# Patient Record
Sex: MALE | Race: BLACK OR AFRICAN AMERICAN | NOT HISPANIC OR LATINO | Employment: FULL TIME | URBAN - METROPOLITAN AREA
[De-identification: names, ages, dates, MRNs, and addresses within clinical notes are randomized per-mention and may not be internally consistent; named-entity substitution may affect disease eponyms.]

---

## 2022-11-16 ENCOUNTER — HOSPITAL ENCOUNTER (OUTPATIENT)
Dept: OTHER | Facility: HOSPITAL | Age: 47
Discharge: HOME | End: 2022-11-16

## 2024-02-29 ENCOUNTER — HOSPITAL ENCOUNTER (EMERGENCY)
Facility: HOSPITAL | Age: 49
Discharge: HOME | End: 2024-02-29
Attending: EMERGENCY MEDICINE
Payer: COMMERCIAL

## 2024-02-29 VITALS
HEART RATE: 84 BPM | WEIGHT: 158 LBS | BODY MASS INDEX: 22.62 KG/M2 | HEIGHT: 70 IN | OXYGEN SATURATION: 93 % | RESPIRATION RATE: 18 BRPM | SYSTOLIC BLOOD PRESSURE: 136 MMHG | TEMPERATURE: 97.7 F | DIASTOLIC BLOOD PRESSURE: 85 MMHG

## 2024-02-29 DIAGNOSIS — Z99.2 CHRONIC KIDNEY DISEASE WITH END STAGE RENAL FAILURE ON DIALYSIS: ICD-10-CM

## 2024-02-29 DIAGNOSIS — E83.51 HYPOCALCEMIA: Primary | ICD-10-CM

## 2024-02-29 DIAGNOSIS — N18.6 CHRONIC KIDNEY DISEASE WITH END STAGE RENAL FAILURE ON DIALYSIS: ICD-10-CM

## 2024-02-29 LAB
ALBUMIN SERPL-MCNC: 3.2 G/DL (ref 3.5–5.2)
ALBUMIN/GLOB SERPL: 1.2 G/DL
ALP SERPL-CCNC: 136 U/L (ref 39–117)
ALT SERPL W P-5'-P-CCNC: 19 U/L (ref 1–41)
ANION GAP SERPL CALCULATED.3IONS-SCNC: 18.4 MMOL/L (ref 5–15)
AST SERPL-CCNC: 17 U/L (ref 1–40)
BASOPHILS # BLD AUTO: 0.02 10*3/MM3 (ref 0–0.2)
BASOPHILS NFR BLD AUTO: 0.3 % (ref 0–1.5)
BILIRUB SERPL-MCNC: 0.3 MG/DL (ref 0–1.2)
BUN SERPL-MCNC: 84 MG/DL (ref 6–20)
BUN/CREAT SERPL: 6.7 (ref 7–25)
CALCIUM SPEC-SCNC: 5.5 MG/DL (ref 8.6–10.5)
CHLORIDE SERPL-SCNC: 95 MMOL/L (ref 98–107)
CO2 SERPL-SCNC: 22.6 MMOL/L (ref 22–29)
CREAT SERPL-MCNC: 12.62 MG/DL (ref 0.76–1.27)
DEPRECATED RDW RBC AUTO: 52 FL (ref 37–54)
EGFRCR SERPLBLD CKD-EPI 2021: 4.5 ML/MIN/1.73
EOSINOPHIL # BLD AUTO: 0.79 10*3/MM3 (ref 0–0.4)
EOSINOPHIL NFR BLD AUTO: 11.1 % (ref 0.3–6.2)
ERYTHROCYTE [DISTWIDTH] IN BLOOD BY AUTOMATED COUNT: 14.4 % (ref 12.3–15.4)
FLUAV SUBTYP SPEC NAA+PROBE: NOT DETECTED
FLUBV RNA ISLT QL NAA+PROBE: NOT DETECTED
GLOBULIN UR ELPH-MCNC: 2.6 GM/DL
GLUCOSE SERPL-MCNC: 120 MG/DL (ref 65–99)
HCT VFR BLD AUTO: 22.4 % (ref 37.5–51)
HGB BLD-MCNC: 7.5 G/DL (ref 13–17.7)
IMM GRANULOCYTES # BLD AUTO: 0.02 10*3/MM3 (ref 0–0.05)
IMM GRANULOCYTES NFR BLD AUTO: 0.3 % (ref 0–0.5)
LYMPHOCYTES # BLD AUTO: 1.35 10*3/MM3 (ref 0.7–3.1)
LYMPHOCYTES NFR BLD AUTO: 18.9 % (ref 19.6–45.3)
MCH RBC QN AUTO: 33.2 PG (ref 26.6–33)
MCHC RBC AUTO-ENTMCNC: 33.5 G/DL (ref 31.5–35.7)
MCV RBC AUTO: 99.1 FL (ref 79–97)
MONOCYTES # BLD AUTO: 0.72 10*3/MM3 (ref 0.1–0.9)
MONOCYTES NFR BLD AUTO: 10.1 % (ref 5–12)
NEUTROPHILS NFR BLD AUTO: 4.23 10*3/MM3 (ref 1.7–7)
NEUTROPHILS NFR BLD AUTO: 59.3 % (ref 42.7–76)
NRBC BLD AUTO-RTO: 0 /100 WBC (ref 0–0.2)
PLATELET # BLD AUTO: 169 10*3/MM3 (ref 140–450)
PMV BLD AUTO: 9.2 FL (ref 6–12)
POTASSIUM SERPL-SCNC: 4.7 MMOL/L (ref 3.5–5.2)
PROT SERPL-MCNC: 5.8 G/DL (ref 6–8.5)
RBC # BLD AUTO: 2.26 10*6/MM3 (ref 4.14–5.8)
RSV RNA NPH QL NAA+NON-PROBE: NOT DETECTED
SARS-COV-2 RNA RESP QL NAA+PROBE: NOT DETECTED
SODIUM SERPL-SCNC: 136 MMOL/L (ref 136–145)
T4 FREE SERPL-MCNC: 0.9 NG/DL (ref 0.93–1.7)
TSH SERPL DL<=0.05 MIU/L-ACNC: 1.12 UIU/ML (ref 0.27–4.2)
WBC NRBC COR # BLD AUTO: 7.13 10*3/MM3 (ref 3.4–10.8)

## 2024-02-29 PROCEDURE — 80050 GENERAL HEALTH PANEL: CPT | Performed by: EMERGENCY MEDICINE

## 2024-02-29 PROCEDURE — 84439 ASSAY OF FREE THYROXINE: CPT | Performed by: EMERGENCY MEDICINE

## 2024-02-29 PROCEDURE — 99283 EMERGENCY DEPT VISIT LOW MDM: CPT

## 2024-02-29 PROCEDURE — 36415 COLL VENOUS BLD VENIPUNCTURE: CPT

## 2024-02-29 PROCEDURE — 87637 SARSCOV2&INF A&B&RSV AMP PRB: CPT | Performed by: EMERGENCY MEDICINE

## 2024-02-29 PROCEDURE — 25010000002 CALCIUM GLUCONATE-NACL 1-0.675 GM/50ML-% SOLUTION: Performed by: EMERGENCY MEDICINE

## 2024-02-29 PROCEDURE — 96365 THER/PROPH/DIAG IV INF INIT: CPT

## 2024-02-29 RX ORDER — NIFEDIPINE 30 MG
TABLET, EXTENDED RELEASE ORAL
COMMUNITY
Start: 2024-02-23

## 2024-02-29 RX ORDER — LABETALOL 300 MG/1
1 TABLET, FILM COATED ORAL EVERY 12 HOURS SCHEDULED
COMMUNITY
Start: 2023-12-31

## 2024-02-29 RX ORDER — CALCIUM GLUCONATE 20 MG/ML
1000 INJECTION, SOLUTION INTRAVENOUS ONCE
Status: COMPLETED | OUTPATIENT
Start: 2024-02-29 | End: 2024-02-29

## 2024-02-29 RX ORDER — ERGOCALCIFEROL 1.25 MG/1
1 CAPSULE ORAL WEEKLY
COMMUNITY
Start: 2023-12-23

## 2024-02-29 RX ORDER — CALCIUM CARBONATE 750 MG/1
750 TABLET, CHEWABLE ORAL
COMMUNITY

## 2024-02-29 RX ORDER — LOSARTAN POTASSIUM 25 MG/1
1 TABLET ORAL DAILY
COMMUNITY
Start: 2023-12-25

## 2024-02-29 RX ADMIN — CALCIUM GLUCONATE 1000 MG: 20 INJECTION, SOLUTION INTRAVENOUS at 15:50

## 2024-02-29 NOTE — Clinical Note
[facility] EMERGENCY ROOM  [address] [place] [address]  Phone: [phone]  Fax: [phone]    Francisco Griffin was seen and treated in our emergency department on 2/29/2024.  He may return to work on 03/01/2024.         Thank you for choosing [facility].    Stef Ruano DO

## 2024-02-29 NOTE — ED PROVIDER NOTES
"Time: 8:53 AM EST  Date of encounter:  2/29/2024  Independent Historian/Clinical History and Information was obtained by:   Patient    History is limited by: N/A    Chief Complaint: Shaking      History of Present Illness:  Patient is a 48 y.o. year old male who presents to the emergency department for evaluation of shaking episode.  Patient states he was at his dialysis appointment as they were accessing his dialysis graft he started having shaking/chills that he said seemed to start in the arm and his left side and then progressed.  Patient is not currently experiencing any chills.  Staff at dialysis recommended the patient come to the emergency department for further evaluation.  Patient is currently denying any symptoms.    HPI    Patient Care Team  Primary Care Provider: Provider, No Known    Past Medical History:     No Known Allergies  Past Medical History:   Diagnosis Date    Renal disorder      History reviewed. No pertinent surgical history.  History reviewed. No pertinent family history.    Home Medications:  Prior to Admission medications    Not on File        Social History:          Review of Systems:  Review of Systems   Constitutional:  Positive for chills.        Physical Exam:  /84 (Patient Position: Lying)   Pulse 86   Temp 97.7 °F (36.5 °C) (Oral)   Resp 18   Ht 177.8 cm (70\")   Wt 71.7 kg (158 lb)   SpO2 97%   BMI 22.67 kg/m²     Physical Exam  Vitals and nursing note reviewed.   Constitutional:       General: He is not in acute distress.  HENT:      Head: Normocephalic.   Eyes:      Extraocular Movements: Extraocular movements intact.   Cardiovascular:      Rate and Rhythm: Normal rate and regular rhythm.      Heart sounds: Normal heart sounds.   Pulmonary:      Effort: Pulmonary effort is normal. No respiratory distress.      Breath sounds: Normal breath sounds.   Abdominal:      Palpations: Abdomen is soft.      Tenderness: There is no abdominal tenderness.   Musculoskeletal:    "      General: Normal range of motion.      Cervical back: Normal range of motion.      Comments: Dialysis graft  left arm   Skin:     General: Skin is warm and dry.   Neurological:      Mental Status: He is alert and oriented to person, place, and time.   Psychiatric:         Mood and Affect: Mood normal.                  Procedures:  Procedures      Medical Decision Making:      Comorbidities that affect care:    Chronic Kidney Disease    External Notes reviewed:    None      The following orders were placed and all results were independently analyzed by me:  No orders of the defined types were placed in this encounter.      Medications Given in the Emergency Department:  Medications - No data to display     ED Course:         Labs:    Lab Results (last 24 hours)       ** No results found for the last 24 hours. **             Imaging:    No Radiology Exams Resulted Within Past 24 Hours      Differential Diagnosis and Discussion:    Viral syndrome: Differential diagnosis includes but is not limited to influenza, common cold, COVID-19, RSV, adenovirus, enteroviruses, herpes virus, hepatitis virus, measles, mumps, rubella, dengue fever, and possible bacterial infection.    All labs were reviewed and interpreted by me.    MDM       {Critical Care:71607}    {SEPSIS RECOGNITION:78474}    Patient Care Considerations:    {Considerations (Optional):77594}      Consultants/Shared Management Plan:    {Shared Management Plan (Optional):11306}    Social Determinants of Health:    Patient is independent, reliable, and has access to care.       Disposition and Care Coordination:    {Admission consideration:39871}    {Discharge (Optional):05240}    Final diagnoses:   None        ED Disposition       None            This medical record created using voice recognition software.           patient was stable and received IV calcium and will follow-up at his next dialysis.    I have explained the patient´s condition, diagnoses and treatment plan based on the information available to me at this time. I have answered questions and addressed any concerns. The patient has a good  understanding of the patient´s diagnosis, condition, and treatment plan as can be expected at this point. The vital signs have been stable. The patient´s condition is stable and appropriate for discharge from the emergency department.      The patient will pursue further outpatient evaluation with the primary care physician or other designated or consulting physician as outlined in the discharge instructions. They are agreeable to this plan of care and follow-up instructions have been explained in detail. The patient has received these instructions in written format and has expressed an understanding of the discharge instructions. The patient is aware that any significant change in condition or worsening of symptoms should prompt an immediate return to this or the closest emergency department or call to 911.    Final diagnoses:   Hypocalcemia   Chronic kidney disease with end stage renal failure on dialysis        ED Disposition       ED Disposition   Discharge    Condition   Stable    Comment   --               This medical record created using voice recognition software.             Stef Ruano DO  03/08/24 5815

## 2024-03-14 ENCOUNTER — APPOINTMENT (OUTPATIENT)
Dept: GENERAL RADIOLOGY | Facility: HOSPITAL | Age: 49
DRG: 811 | End: 2024-03-14
Payer: COMMERCIAL

## 2024-03-14 ENCOUNTER — HOSPITAL ENCOUNTER (INPATIENT)
Facility: HOSPITAL | Age: 49
LOS: 1 days | Discharge: LEFT BEFORE DISCHARGE | DRG: 811 | End: 2024-03-15
Attending: EMERGENCY MEDICINE | Admitting: INTERNAL MEDICINE
Payer: COMMERCIAL

## 2024-03-14 DIAGNOSIS — J10.1 INFLUENZA A: Primary | ICD-10-CM

## 2024-03-14 DIAGNOSIS — H11.423 CHEMOSIS OF CONJUNCTIVA OF BOTH EYES: ICD-10-CM

## 2024-03-14 DIAGNOSIS — N18.9 CHRONIC KIDNEY DISEASE, UNSPECIFIED CKD STAGE: ICD-10-CM

## 2024-03-14 DIAGNOSIS — D64.9 ANEMIA, UNSPECIFIED TYPE: ICD-10-CM

## 2024-03-14 LAB
ABO GROUP BLD: NORMAL
ABO GROUP BLD: NORMAL
ALBUMIN SERPL-MCNC: 2.9 G/DL (ref 3.5–5.2)
ALBUMIN/GLOB SERPL: 1.1 G/DL
ALP SERPL-CCNC: 79 U/L (ref 39–117)
ALT SERPL W P-5'-P-CCNC: 41 U/L (ref 1–41)
ANION GAP SERPL CALCULATED.3IONS-SCNC: 23 MMOL/L (ref 5–15)
AST SERPL-CCNC: 47 U/L (ref 1–40)
BACTERIA UR QL AUTO: NORMAL /HPF
BASOPHILS # BLD AUTO: 0.01 10*3/MM3 (ref 0–0.2)
BASOPHILS NFR BLD AUTO: 0.2 % (ref 0–1.5)
BILIRUB SERPL-MCNC: 0.3 MG/DL (ref 0–1.2)
BILIRUB UR QL STRIP: NEGATIVE
BLD GP AB SCN SERPL QL: NEGATIVE
BUN SERPL-MCNC: 124 MG/DL (ref 6–20)
BUN/CREAT SERPL: 9.7 (ref 7–25)
CALCIUM SPEC-SCNC: 6.1 MG/DL (ref 8.6–10.5)
CHLORIDE SERPL-SCNC: 92 MMOL/L (ref 98–107)
CLARITY UR: CLEAR
CO2 SERPL-SCNC: 16 MMOL/L (ref 22–29)
COLOR UR: YELLOW
CREAT SERPL-MCNC: 12.76 MG/DL (ref 0.76–1.27)
DEPRECATED RDW RBC AUTO: 48.4 FL (ref 37–54)
EGFRCR SERPLBLD CKD-EPI 2021: 4.4 ML/MIN/1.73
EOSINOPHIL # BLD AUTO: 0.3 10*3/MM3 (ref 0–0.4)
EOSINOPHIL NFR BLD AUTO: 7.1 % (ref 0.3–6.2)
ERYTHROCYTE [DISTWIDTH] IN BLOOD BY AUTOMATED COUNT: 14.4 % (ref 12.3–15.4)
FLUAV SUBTYP SPEC NAA+PROBE: DETECTED
FLUBV RNA ISLT QL NAA+PROBE: NOT DETECTED
GLOBULIN UR ELPH-MCNC: 2.6 GM/DL
GLUCOSE SERPL-MCNC: 111 MG/DL (ref 65–99)
GLUCOSE UR STRIP-MCNC: ABNORMAL MG/DL
HCT VFR BLD AUTO: 18.5 % (ref 37.5–51)
HEMOCCULT STL QL IA: NEGATIVE
HGB BLD-MCNC: 6.5 G/DL (ref 13–17.7)
HGB UR QL STRIP.AUTO: ABNORMAL
HOLD SPECIMEN: NORMAL
HOLD SPECIMEN: NORMAL
HYALINE CASTS UR QL AUTO: NORMAL /LPF
IMM GRANULOCYTES # BLD AUTO: 0.02 10*3/MM3 (ref 0–0.05)
IMM GRANULOCYTES NFR BLD AUTO: 0.5 % (ref 0–0.5)
KETONES UR QL STRIP: NEGATIVE
LEUKOCYTE ESTERASE UR QL STRIP.AUTO: NEGATIVE
LIPASE SERPL-CCNC: 477 U/L (ref 13–60)
LYMPHOCYTES # BLD AUTO: 0.85 10*3/MM3 (ref 0.7–3.1)
LYMPHOCYTES NFR BLD AUTO: 20 % (ref 19.6–45.3)
MCH RBC QN AUTO: 33 PG (ref 26.6–33)
MCHC RBC AUTO-ENTMCNC: 35.1 G/DL (ref 31.5–35.7)
MCV RBC AUTO: 93.9 FL (ref 79–97)
MONOCYTES # BLD AUTO: 0.28 10*3/MM3 (ref 0.1–0.9)
MONOCYTES NFR BLD AUTO: 6.6 % (ref 5–12)
NEUTROPHILS NFR BLD AUTO: 2.78 10*3/MM3 (ref 1.7–7)
NEUTROPHILS NFR BLD AUTO: 65.6 % (ref 42.7–76)
NITRITE UR QL STRIP: NEGATIVE
NRBC BLD AUTO-RTO: 0 /100 WBC (ref 0–0.2)
PH UR STRIP.AUTO: 7.5 [PH] (ref 5–8)
PLATELET # BLD AUTO: 68 10*3/MM3 (ref 140–450)
PMV BLD AUTO: 9.3 FL (ref 6–12)
POTASSIUM SERPL-SCNC: 4.7 MMOL/L (ref 3.5–5.2)
PROT SERPL-MCNC: 5.5 G/DL (ref 6–8.5)
PROT UR QL STRIP: ABNORMAL
RBC # BLD AUTO: 1.97 10*6/MM3 (ref 4.14–5.8)
RBC # UR STRIP: NORMAL /HPF
REF LAB TEST METHOD: NORMAL
RH BLD: POSITIVE
RH BLD: POSITIVE
RSV RNA NPH QL NAA+NON-PROBE: NOT DETECTED
SARS-COV-2 RNA RESP QL NAA+PROBE: NOT DETECTED
SODIUM SERPL-SCNC: 131 MMOL/L (ref 136–145)
SP GR UR STRIP: 1.01 (ref 1–1.03)
SQUAMOUS #/AREA URNS HPF: NORMAL /HPF
T&S EXPIRATION DATE: NORMAL
UROBILINOGEN UR QL STRIP: ABNORMAL
WBC # UR STRIP: NORMAL /HPF
WBC NRBC COR # BLD AUTO: 4.24 10*3/MM3 (ref 3.4–10.8)
WHOLE BLOOD HOLD COAG: NORMAL
WHOLE BLOOD HOLD SPECIMEN: NORMAL

## 2024-03-14 PROCEDURE — 86901 BLOOD TYPING SEROLOGIC RH(D): CPT | Performed by: REGISTERED NURSE

## 2024-03-14 PROCEDURE — 82274 ASSAY TEST FOR BLOOD FECAL: CPT | Performed by: REGISTERED NURSE

## 2024-03-14 PROCEDURE — 99223 1ST HOSP IP/OBS HIGH 75: CPT | Performed by: INTERNAL MEDICINE

## 2024-03-14 PROCEDURE — 81001 URINALYSIS AUTO W/SCOPE: CPT | Performed by: EMERGENCY MEDICINE

## 2024-03-14 PROCEDURE — 86901 BLOOD TYPING SEROLOGIC RH(D): CPT

## 2024-03-14 PROCEDURE — P9016 RBC LEUKOCYTES REDUCED: HCPCS

## 2024-03-14 PROCEDURE — 86900 BLOOD TYPING SEROLOGIC ABO: CPT

## 2024-03-14 PROCEDURE — 87637 SARSCOV2&INF A&B&RSV AMP PRB: CPT | Performed by: REGISTERED NURSE

## 2024-03-14 PROCEDURE — 99285 EMERGENCY DEPT VISIT HI MDM: CPT

## 2024-03-14 PROCEDURE — 80053 COMPREHEN METABOLIC PANEL: CPT | Performed by: EMERGENCY MEDICINE

## 2024-03-14 PROCEDURE — 86923 COMPATIBILITY TEST ELECTRIC: CPT

## 2024-03-14 PROCEDURE — 71046 X-RAY EXAM CHEST 2 VIEWS: CPT

## 2024-03-14 PROCEDURE — 86900 BLOOD TYPING SEROLOGIC ABO: CPT | Performed by: REGISTERED NURSE

## 2024-03-14 PROCEDURE — 85025 COMPLETE CBC W/AUTO DIFF WBC: CPT | Performed by: EMERGENCY MEDICINE

## 2024-03-14 PROCEDURE — 36430 TRANSFUSION BLD/BLD COMPNT: CPT

## 2024-03-14 PROCEDURE — 86850 RBC ANTIBODY SCREEN: CPT | Performed by: REGISTERED NURSE

## 2024-03-14 PROCEDURE — 83690 ASSAY OF LIPASE: CPT | Performed by: EMERGENCY MEDICINE

## 2024-03-14 RX ORDER — SODIUM CHLORIDE 0.9 % (FLUSH) 0.9 %
10 SYRINGE (ML) INJECTION AS NEEDED
Status: DISCONTINUED | OUTPATIENT
Start: 2024-03-14 | End: 2024-03-16 | Stop reason: HOSPADM

## 2024-03-14 NOTE — ED PROVIDER NOTES
Time: 7:46 PM EDT  Date of encounter:  3/14/2024  Independent Historian/Clinical History and Information was obtained by:   Patient    History is limited by: N/A    Chief Complaint: Watery eyes, diarrhea      History of Present Illness:  Patient is a 48 y.o. year old male who presents to the emergency department for evaluation of diarrhea for the past 3 days.  Patient states diarrhea has now resolved.  Patient states that he developed a cough as well as eye swelling yesterday.  Also complains of significant fatigue.  States his eyes have been watery and vision has been blurry due to excessive tearing.  Patient denies nausea/vomiting, fever/chills.  Patient states today is the first day he has not had diarrhea.  Patient denies recent antibiotic use.  He admits to black stools in the past few days.  Patient is on hemodialysis Monday Wednesday Friday, last dialysis was on Monday.  Patient states he missed dialysis yesterday.    HPI    Patient Care Team  Primary Care Provider: Provider, No Known    Past Medical History:     No Known Allergies  Past Medical History:   Diagnosis Date    Renal disorder      History reviewed. No pertinent surgical history.  History reviewed. No pertinent family history.    Home Medications:  Prior to Admission medications    Medication Sig Start Date End Date Taking? Authorizing Provider   calcium carbonate EX (TUMS EX) 750 MG chewable tablet Chew 1 tablet.    Maureen Looney MD   labetalol (NORMODYNE) 300 MG tablet Take 1 tablet by mouth Every 12 (Twelve) Hours. 12/31/23   Maureen Looney MD   losartan (COZAAR) 25 MG tablet Take 1 tablet by mouth Daily. 12/25/23   Maureen Looney MD   NIFEdipine CC (ADALAT CC) 30 MG 24 hr tablet TAKE 2 TABELTS BY MOUTH TWICE DAILY 2/23/24   Maureen Looney MD   vitamin D (ERGOCALCIFEROL) 1.25 MG (10935 UT) capsule capsule Take 1 capsule by mouth 1 (One) Time Per Week. 12/23/23   Maureen Looney MD        Social History:  "  Social History     Tobacco Use    Smoking status: Never     Passive exposure: Never    Smokeless tobacco: Never   Vaping Use    Vaping status: Never Used   Substance Use Topics    Alcohol use: Never    Drug use: Never         Review of Systems:  Review of Systems   Constitutional:  Negative for chills and fever.   HENT:  Negative for congestion, ear pain and sore throat.    Eyes:  Positive for discharge. Negative for pain.   Respiratory:  Positive for cough. Negative for chest tightness and shortness of breath.    Cardiovascular:  Negative for chest pain.   Gastrointestinal:  Positive for blood in stool (Black stool) and diarrhea. Negative for abdominal pain, nausea and vomiting.   Genitourinary:  Negative for dysuria, flank pain and hematuria.   Musculoskeletal:  Negative for joint swelling.   Skin:  Negative for pallor.   Neurological:  Negative for seizures and headaches.   All other systems reviewed and are negative.       Physical Exam:  /96 (BP Location: Right arm, Patient Position: Sitting)   Pulse 88   Temp 98.4 °F (36.9 °C) (Oral)   Resp 18   Ht 177.8 cm (70\")   Wt 68.2 kg (150 lb 5.7 oz)   SpO2 100%   BMI 21.57 kg/m²     Physical Exam  Vitals and nursing note reviewed.   Constitutional:       General: He is not in acute distress.     Appearance: Normal appearance. He is ill-appearing. He is not toxic-appearing.   HENT:      Head: Normocephalic and atraumatic.      Mouth/Throat:      Mouth: Mucous membranes are moist.   Eyes:      General: No scleral icterus.     Extraocular Movements: Extraocular movements intact.      Conjunctiva/sclera:      Right eye: Chemosis present.      Left eye: Chemosis present.   Cardiovascular:      Rate and Rhythm: Normal rate and regular rhythm.      Pulses:           Radial pulses are 2+ on the right side and 2+ on the left side.      Heart sounds: Normal heart sounds.   Pulmonary:      Effort: Pulmonary effort is normal. No respiratory distress.      Breath " sounds: Normal breath sounds.   Abdominal:      General: Abdomen is flat. Bowel sounds are normal.      Palpations: Abdomen is soft.      Tenderness: There is no abdominal tenderness.   Musculoskeletal:         General: Normal range of motion.      Cervical back: Normal range of motion and neck supple.      Right lower le+      Left lower le+   Skin:     General: Skin is warm and dry.   Neurological:      Mental Status: He is alert and oriented to person, place, and time. Mental status is at baseline.                Procedures:  Procedures      Medical Decision Making:      Comorbidities that affect care:    Chronic Kidney Disease    External Notes reviewed:    Previous ED Note: 2024      The following orders were placed and all results were independently analyzed by me:  Orders Placed This Encounter   Procedures    COVID-19, FLU A/B, RSV PCR 1 HR TAT - Swab, Nasopharynx    XR Chest 2 View    [place] Draw    Comprehensive Metabolic Panel    Lipase    Urinalysis With Microscopic If Indicated (No Culture) - Urine, Clean Catch    CBC Auto Differential    Occult Blood, Fecal By Immunoassay - Stool, Per Rectum    Urinalysis, Microscopic Only - Urine, Clean Catch    CBC (No Diff)    Comprehensive Metabolic Panel    Diet: Liquid; Clear Liquid; Fluid Consistency: Thin (IDDSI 0)    Undress & Gown    Ice pack or cool rag to eyes  Misc Nursing Order (Specify)    Verify Informed Consent for Blood Product Administration    Vital Signs Recheck    Vital Signs Recheck    Vital Signs    Intake & Output    Weigh Patient    Oral Care    Place Sequential Compression Device    Maintain Sequential Compression Device    Telemetry - Maintain IV Access    Telemetry - Place Orders & Notify Provider of Results When Patient Experiences Acute Chest Pain, Dysrhythmia or Respiratory Distress    Up With Assistance    Notify Provider (With Default Parameters)    Code Status and Medical Interventions:    Hospitalist (on-call MD unless  specified)    Inpatient Nephrology Consult    Prepare RBC, 2 Units    Type & Screen    ABO RH Specimen Verification    Prepare RBC, 1 Units    Insert Peripheral IV    Insert Peripheral IV    Access VAD    Inpatient Admission    CBC & Differential    Green Top (Gel)    Lavender Top    Gold Top - SST    Light Blue Top       Medications Given in the Emergency Department:  Medications   sodium chloride 0.9 % flush 10 mL (has no administration in time range)   sodium chloride 0.9 % flush 10 mL (10 mL Intravenous Not Given 3/15/24 0041)   sodium chloride 0.9 % flush 10 mL (has no administration in time range)   sodium chloride 0.9 % infusion 40 mL (has no administration in time range)   nitroglycerin (NITROSTAT) SL tablet 0.4 mg (has no administration in time range)   acetaminophen (TYLENOL) tablet 650 mg (has no administration in time range)     Or   acetaminophen (TYLENOL) 160 MG/5ML oral solution 650 mg (has no administration in time range)     Or   acetaminophen (TYLENOL) suppository 650 mg (has no administration in time range)   sennosides-docusate (PERICOLACE) 8.6-50 MG per tablet 2 tablet (has no administration in time range)     And   polyethylene glycol (MIRALAX) packet 17 g (has no administration in time range)     And   bisacodyl (DULCOLAX) EC tablet 5 mg (has no administration in time range)     And   bisacodyl (DULCOLAX) suppository 10 mg (has no administration in time range)   sodium chloride 0.9 % flush 10 mL (has no administration in time range)   pantoprazole (PROTONIX) injection 40 mg (40 mg Intravenous Not Given 3/15/24 0041)   benzonatate (TESSALON) capsule 100 mg (100 mg Oral Given 3/15/24 0037)   guaiFENesin (MUCINEX) 12 hr tablet 600 mg (600 mg Oral Given 3/15/24 0036)        ED Course:         Labs:    Lab Results (last 24 hours)       Procedure Component Value Units Date/Time    POC Influenza A/B [[id number]]  (Abnormal) Collected: 03/14/24 1819    Specimen: Swab Updated: 03/14/24 1820     Rapid  Influenza A Ag Positive     Rapid Influenza B Ag Negative     Internal Control Passed     Lot Number [id number]     Expiration Date 12,082,025    POCT SARS-CoV-2 Antigen NEGAR   (Sirena Perea) [[id number]] Collected: 03/14/24 1824    Specimen: Swab Updated: 03/14/24 1824     SARS Antigen Not Detected     Internal Control Passed     Lot Number [id number]     Expiration Date 7,032,024    CBC & Differential [[id number]]  (Abnormal) Collected: 03/14/24 2025    Specimen: Blood Updated: 03/14/24 2039    Narrative:      The following orders were created for panel order CBC & Differential.  Procedure                               Abnormality         Status                     ---------                               -----------         ------                     CBC Auto Differential[[id number]]        Abnormal            Final result                 Please view results for these tests on the individual orders.    Comprehensive Metabolic Panel [[id number]]  (Abnormal) Collected: 03/14/24 2025    Specimen: Blood Updated: 03/14/24 2105     Glucose 111 mg/dL       mg/dL      Creatinine 12.76 mg/dL      Sodium 131 mmol/L      Potassium 4.7 mmol/L      Chloride 92 mmol/L      CO2 16.0 mmol/L      Calcium 6.1 mg/dL      Total Protein 5.5 g/dL      Albumin 2.9 g/dL      ALT (SGPT) 41 U/L      AST (SGOT) 47 U/L      Alkaline Phosphatase 79 U/L      Total Bilirubin 0.3 mg/dL      Globulin 2.6 gm/dL      A/G Ratio 1.1 g/dL      BUN/Creatinine Ratio 9.7     Anion Gap 23.0 mmol/L      eGFR 4.4 mL/min/1.73      Comment: <15 Indicative of kidney failure       Narrative:      GFR Normal >60  Chronic Kidney Disease <60  Kidney Failure <15      Lipase [[id number]]  (Abnormal) Collected: 03/14/24 2025    Specimen: Blood Updated: 03/14/24 2105     Lipase 477 U/L     CBC Auto Differential [[id number]]  (Abnormal) Collected: 03/14/24 2025    Specimen: Blood Updated: 03/14/24 2039     WBC 4.24 10*3/mm3      RBC 1.97 10*6/mm3      Hemoglobin 6.5 g/dL       Hematocrit 18.5 %      MCV 93.9 fL      MCH 33.0 pg      MCHC 35.1 g/dL      RDW 14.4 %      RDW-SD 48.4 fl      MPV 9.3 fL      Platelets 68 10*3/mm3      Neutrophil % 65.6 %      Lymphocyte % 20.0 %      Monocyte % 6.6 %      Eosinophil % 7.1 %      Basophil % 0.2 %      Immature Grans % 0.5 %      Neutrophils, Absolute 2.78 10*3/mm3      Lymphocytes, Absolute 0.85 10*3/mm3      Monocytes, Absolute 0.28 10*3/mm3      Eosinophils, Absolute 0.30 10*3/mm3      Basophils, Absolute 0.01 10*3/mm3      Immature Grans, Absolute 0.02 10*3/mm3      nRBC 0.0 /100 WBC     Urinalysis With Microscopic If Indicated (No Culture) - Urine, Clean Catch [[id number]]  (Abnormal) Collected: 03/14/24 2049    Specimen: Urine, Clean Catch Updated: 03/14/24 2110     Color, UA Yellow     Appearance, UA Clear     pH, UA 7.5     Specific Gravity, UA 1.012     Glucose,  mg/dL (Trace)     Ketones, UA Negative     Bilirubin, UA Negative     Blood, UA Small (1+)     Protein, UA >=300 mg/dL (3+)     Leuk Esterase, UA Negative     Nitrite, UA Negative     Urobilinogen, UA 0.2 E.U./dL    Urinalysis, Microscopic Only - Urine, Clean Catch [[id number]] Collected: 03/14/24 2049    Specimen: Urine, Clean Catch Updated: 03/14/24 2132     RBC, UA 0-2 /HPF      WBC, UA 0-2 /HPF      Bacteria, UA None Seen /HPF      Squamous Epithelial Cells, UA None Seen /HPF      Hyaline Casts, UA None Seen /LPF      Methodology Manual Light Microscopy    Occult Blood, Fecal By Immunoassay - Stool, Per Rectum [[id number]]  (Normal) Collected: 03/14/24 2058    Specimen: Stool from Per Rectum Updated: 03/14/24 2121     Occult Blood, Fecal by Immunoassay Negative    COVID-19, FLU A/B, RSV PCR 1 HR TAT - Swab, Nasopharynx [[id number]]  (Abnormal) Collected: 03/14/24 2058    Specimen: Swab from Nasopharynx Updated: 03/14/24 2152     COVID19 Not Detected     Influenza A PCR Detected     Influenza B PCR Not Detected     RSV, PCR Not Detected    Narrative:      Fact sheet  for providers: https://www.fda.gov/media/548603/download    Fact sheet for patients: https://www.fda.gov/media/513574/download    Test performed by PCR.             Imaging:    XR Chest 2 View    Result Date: 3/14/2024  PROCEDURE: XR CHEST 2 VW  COMPARISON: None  INDICATIONS: Cough, fatigue  chest congestion weak  FINDINGS:  There is moderate perihilar airspace opacity bilaterally.  Airspace opacity is also noted in the lung bases.  Mild cardiomegaly is noted.  No significant effusion is seen.  A left jugular port infusion catheter is in good position.        1. Findings most suggestive of moderate congestive heart failure.  Superimposed pneumonia, aspiration or pulmonary hemorrhage is not excluded.     Dandy Elizondo M.D.       Electronically Signed and Approved By: Dandy Elizondo M.D. on 3/14/2024 at 21:28                Differential Diagnosis and Discussion:    Cough: Differential diagnosis includes but is not limited to pneumonia, acute bronchitis, upper respiratory infection, ACE inhibitor use, allergic reaction, epiglottitis, seasonal allergies, chemical irritants, exercise-induced asthma, viral syndrome.  Diarrhea: Differential diagnosis includes but is not limited to malabsorption syndrome, bacterial infection, carcinoid syndrome, pancreatic hypersecretion, viral infection, celiac sprue, Crohn's disease, ulcerative colitis, ischemic colitis, colitis, hypermotility, and irritable bowel syndrome.  Eye Pain/Blurred Vision: Differential diagnosis includes but is not limited to dacryocystitis, hordeolum, chalazion, periorbital cellulitis, cavernous sinus thrombosis, blepharitis, and glaucoma.    All labs were reviewed and interpreted by me.    MDM  Number of Diagnoses or Management Options  Anemia, unspecified type  Chemosis of conjunctiva of both eyes  Chronic kidney disease, unspecified CKD stage  Influenza A  Diagnosis management comments: Patient presented with a 3-day history of diarrhea, also severe fatigue  and cough for 2 days.  Missed dialysis yesterday, M/W/F.  The patient's hemoglobin was 6.5.  The patient was symptomatic (fatigue) and will be transfused with 1 unit of PRBC.  Patient appears to be fluid overloaded with chest x-ray showing pulmonary edema, bilateral chemosis as well is worsening pedal edema.  Additionally the patient was positive for influenza A.  Patient will be admitted for hemodialysis as well as further management of anemia.       Amount and/or Complexity of Data Reviewed  Clinical lab tests: reviewed and ordered  Decide to obtain previous medical records or to obtain history from someone other than the patient: yes    Risk of Complications, Morbidity, and/or Mortality  Presenting problems: moderate  Diagnostic procedures: moderate  Management options: moderate    Patient Progress  Patient progress: stable                 Patient Care Considerations:    SEPSIS was considered but is NOT present in the emergency department as SIRS criteria is not present.      Consultants/Shared Management Plan:    Hospitalist: I have discussed the case with Dr. Moraes who agrees to accept the patient for admission.    Social Determinants of Health:    Patient is independent, reliable, and has access to care.       Disposition and Care Coordination:    Admit:   Through independent evaluation of the patient's history, physical, and imperical data, the patient meets criteria for inpatient admission to the hospital.        Final diagnoses:   Influenza A   Anemia, unspecified type   Chemosis of conjunctiva of both eyes   Chronic kidney disease, unspecified CKD stage        ED Disposition       ED Disposition   Decision to Admit    Condition   --    Comment   Level of Care: Telemetry [5]  Diagnosis: Symptomatic anemia [[id number]]  Certification: I Certify That Inpatient Hospital Services Are Medically Necessary For Greater Than 2 Midnights                 This medical record created using voice recognition  software.             Lidya Barnes, BELTRAN  03/15/24 0128

## 2024-03-14 NOTE — Clinical Note
Level of Care: Telemetry [5]   Diagnosis: Symptomatic anemia [[id number]]   Certification: I Certify That Inpatient Hospital Services Are Medically Necessary For Greater Than 2 Midnights

## 2024-03-15 ENCOUNTER — PREP FOR SURGERY (OUTPATIENT)
Dept: OTHER | Facility: HOSPITAL | Age: 49
End: 2024-03-15
Payer: COMMERCIAL

## 2024-03-15 VITALS
WEIGHT: 150.35 LBS | HEART RATE: 102 BPM | OXYGEN SATURATION: 98 % | HEIGHT: 70 IN | SYSTOLIC BLOOD PRESSURE: 183 MMHG | DIASTOLIC BLOOD PRESSURE: 97 MMHG | TEMPERATURE: 99 F | BODY MASS INDEX: 21.53 KG/M2 | RESPIRATION RATE: 17 BRPM

## 2024-03-15 DIAGNOSIS — D64.9 SYMPTOMATIC ANEMIA: Primary | ICD-10-CM

## 2024-03-15 PROBLEM — J10.1 INFLUENZA A: Status: ACTIVE | Noted: 2024-03-15

## 2024-03-15 PROBLEM — I10 PRIMARY HYPERTENSION: Status: ACTIVE | Noted: 2024-03-15

## 2024-03-15 PROBLEM — N18.6 ESRD (END STAGE RENAL DISEASE): Status: ACTIVE | Noted: 2024-03-15

## 2024-03-15 LAB
ALBUMIN SERPL-MCNC: 2.8 G/DL (ref 3.5–5.2)
ALBUMIN/GLOB SERPL: 1.2 G/DL
ALP SERPL-CCNC: 61 U/L (ref 39–117)
ALT SERPL W P-5'-P-CCNC: 33 U/L (ref 1–41)
ANION GAP SERPL CALCULATED.3IONS-SCNC: 23 MMOL/L (ref 5–15)
ANISOCYTOSIS BLD QL: ABNORMAL
AST SERPL-CCNC: 38 U/L (ref 1–40)
BASOPHILS # BLD AUTO: 0.01 10*3/MM3 (ref 0–0.2)
BASOPHILS NFR BLD AUTO: 0.3 % (ref 0–1.5)
BASOPHILS NFR BLD MANUAL: 1 % (ref 0–1.5)
BILIRUB SERPL-MCNC: 0.3 MG/DL (ref 0–1.2)
BUN SERPL-MCNC: 123 MG/DL (ref 6–20)
BUN/CREAT SERPL: 9.5 (ref 7–25)
BURR CELLS BLD QL SMEAR: ABNORMAL
CALCIUM SPEC-SCNC: 5.7 MG/DL (ref 8.6–10.5)
CHLORIDE SERPL-SCNC: 94 MMOL/L (ref 98–107)
CO2 SERPL-SCNC: 15 MMOL/L (ref 22–29)
CREAT SERPL-MCNC: 12.91 MG/DL (ref 0.76–1.27)
DEPRECATED RDW RBC AUTO: 49.9 FL (ref 37–54)
DEPRECATED RDW RBC AUTO: 51.1 FL (ref 37–54)
EGFRCR SERPLBLD CKD-EPI 2021: 4.4 ML/MIN/1.73
ELLIPTOCYTES BLD QL SMEAR: ABNORMAL
EOSINOPHIL # BLD AUTO: 0.21 10*3/MM3 (ref 0–0.4)
EOSINOPHIL NFR BLD AUTO: 6.3 % (ref 0.3–6.2)
ERYTHROCYTE [DISTWIDTH] IN BLOOD BY AUTOMATED COUNT: 14.8 % (ref 12.3–15.4)
ERYTHROCYTE [DISTWIDTH] IN BLOOD BY AUTOMATED COUNT: 15.3 % (ref 12.3–15.4)
GLOBULIN UR ELPH-MCNC: 2.4 GM/DL
GLUCOSE SERPL-MCNC: 98 MG/DL (ref 65–99)
HBV SURFACE AB SER RIA-ACNC: REACTIVE
HBV SURFACE AG SERPL QL IA: NORMAL
HCT VFR BLD AUTO: 20.5 % (ref 37.5–51)
HCT VFR BLD AUTO: 21.9 % (ref 37.5–51)
HGB BLD-MCNC: 7.1 G/DL (ref 13–17.7)
HGB BLD-MCNC: 7.3 G/DL (ref 13–17.7)
LYMPHOCYTES # BLD AUTO: 0.44 10*3/MM3 (ref 0.7–3.1)
LYMPHOCYTES # BLD MANUAL: ABNORMAL 10*3/UL
LYMPHOCYTES NFR BLD AUTO: 13.3 % (ref 19.6–45.3)
MCH RBC QN AUTO: 30.7 PG (ref 26.6–33)
MCH RBC QN AUTO: 31.6 PG (ref 26.6–33)
MCHC RBC AUTO-ENTMCNC: 33.3 G/DL (ref 31.5–35.7)
MCHC RBC AUTO-ENTMCNC: 34.6 G/DL (ref 31.5–35.7)
MCV RBC AUTO: 91.1 FL (ref 79–97)
MCV RBC AUTO: 92 FL (ref 79–97)
MICROCYTES BLD QL: ABNORMAL
MONOCYTES # BLD AUTO: 0.26 10*3/MM3 (ref 0.1–0.9)
MONOCYTES NFR BLD AUTO: 7.9 % (ref 5–12)
NEUTROPHILS # BLD AUTO: ABNORMAL 10*3/UL
NEUTROPHILS NFR BLD AUTO: 2.38 10*3/MM3 (ref 1.7–7)
NEUTROPHILS NFR BLD AUTO: 71.9 % (ref 42.7–76)
NEUTROPHILS NFR BLD MANUAL: 92 % (ref 42.7–76)
PATHOLOGY REVIEW: YES
PLATELET # BLD AUTO: 61 10*3/MM3 (ref 140–450)
PLATELET # BLD AUTO: 71 10*3/MM3 (ref 140–450)
PMV BLD AUTO: 10.2 FL (ref 6–12)
PMV BLD AUTO: 9.7 FL (ref 6–12)
POIKILOCYTOSIS BLD QL SMEAR: ABNORMAL
POTASSIUM SERPL-SCNC: 4.9 MMOL/L (ref 3.5–5.2)
PROT SERPL-MCNC: 5.2 G/DL (ref 6–8.5)
RBC # BLD AUTO: 2.25 10*6/MM3 (ref 4.14–5.8)
RBC # BLD AUTO: 2.38 10*6/MM3 (ref 4.14–5.8)
SMALL PLATELETS BLD QL SMEAR: ABNORMAL
SODIUM SERPL-SCNC: 132 MMOL/L (ref 136–145)
VARIANT LYMPHS NFR BLD MANUAL: 7 % (ref 19.6–45.3)
WBC MORPH BLD: NORMAL
WBC NRBC COR # BLD AUTO: 3.31 10*3/MM3 (ref 3.4–10.8)
WBC NRBC COR # BLD AUTO: 3.76 10*3/MM3 (ref 3.4–10.8)

## 2024-03-15 PROCEDURE — 85025 COMPLETE CBC W/AUTO DIFF WBC: CPT | Performed by: STUDENT IN AN ORGANIZED HEALTH CARE EDUCATION/TRAINING PROGRAM

## 2024-03-15 PROCEDURE — 86706 HEP B SURFACE ANTIBODY: CPT | Performed by: STUDENT IN AN ORGANIZED HEALTH CARE EDUCATION/TRAINING PROGRAM

## 2024-03-15 PROCEDURE — 87340 HEPATITIS B SURFACE AG IA: CPT | Performed by: STUDENT IN AN ORGANIZED HEALTH CARE EDUCATION/TRAINING PROGRAM

## 2024-03-15 PROCEDURE — 5A1D70Z PERFORMANCE OF URINARY FILTRATION, INTERMITTENT, LESS THAN 6 HOURS PER DAY: ICD-10-PCS | Performed by: STUDENT IN AN ORGANIZED HEALTH CARE EDUCATION/TRAINING PROGRAM

## 2024-03-15 PROCEDURE — 25010000002 CALCIUM GLUCONATE 2-0.675 GM/100ML-% SOLUTION: Performed by: STUDENT IN AN ORGANIZED HEALTH CARE EDUCATION/TRAINING PROGRAM

## 2024-03-15 PROCEDURE — 80053 COMPREHEN METABOLIC PANEL: CPT | Performed by: INTERNAL MEDICINE

## 2024-03-15 PROCEDURE — 25010000002 HYDRALAZINE PER 20 MG: Performed by: INTERNAL MEDICINE

## 2024-03-15 PROCEDURE — 85027 COMPLETE CBC AUTOMATED: CPT | Performed by: INTERNAL MEDICINE

## 2024-03-15 PROCEDURE — 99232 SBSQ HOSP IP/OBS MODERATE 35: CPT | Performed by: INTERNAL MEDICINE

## 2024-03-15 PROCEDURE — 99222 1ST HOSP IP/OBS MODERATE 55: CPT | Performed by: INTERNAL MEDICINE

## 2024-03-15 RX ORDER — NIFEDIPINE 30 MG/1
30 TABLET, EXTENDED RELEASE ORAL 2 TIMES DAILY
Status: DISCONTINUED | OUTPATIENT
Start: 2024-03-16 | End: 2024-03-16 | Stop reason: HOSPADM

## 2024-03-15 RX ORDER — NITROGLYCERIN 0.4 MG/1
0.4 TABLET SUBLINGUAL
Status: DISCONTINUED | OUTPATIENT
Start: 2024-03-15 | End: 2024-03-16 | Stop reason: HOSPADM

## 2024-03-15 RX ORDER — PANTOPRAZOLE SODIUM 40 MG/10ML
40 INJECTION, POWDER, LYOPHILIZED, FOR SOLUTION INTRAVENOUS EVERY 12 HOURS SCHEDULED
Status: DISCONTINUED | OUTPATIENT
Start: 2024-03-15 | End: 2024-03-16 | Stop reason: HOSPADM

## 2024-03-15 RX ORDER — GUAIFENESIN 600 MG/1
600 TABLET, EXTENDED RELEASE ORAL EVERY 12 HOURS PRN
Status: DISCONTINUED | OUTPATIENT
Start: 2024-03-15 | End: 2024-03-16 | Stop reason: HOSPADM

## 2024-03-15 RX ORDER — BENZONATATE 100 MG/1
100 CAPSULE ORAL 3 TIMES DAILY PRN
Status: DISCONTINUED | OUTPATIENT
Start: 2024-03-15 | End: 2024-03-16 | Stop reason: HOSPADM

## 2024-03-15 RX ORDER — ACETAMINOPHEN 650 MG/1
650 SUPPOSITORY RECTAL EVERY 4 HOURS PRN
Status: DISCONTINUED | OUTPATIENT
Start: 2024-03-15 | End: 2024-03-16 | Stop reason: HOSPADM

## 2024-03-15 RX ORDER — AMOXICILLIN 250 MG
2 CAPSULE ORAL 2 TIMES DAILY PRN
Status: DISCONTINUED | OUTPATIENT
Start: 2024-03-15 | End: 2024-03-16 | Stop reason: HOSPADM

## 2024-03-15 RX ORDER — BISACODYL 5 MG/1
5 TABLET, DELAYED RELEASE ORAL DAILY PRN
Status: DISCONTINUED | OUTPATIENT
Start: 2024-03-15 | End: 2024-03-16 | Stop reason: HOSPADM

## 2024-03-15 RX ORDER — POLYETHYLENE GLYCOL 3350 17 G/17G
17 POWDER, FOR SOLUTION ORAL DAILY PRN
Status: DISCONTINUED | OUTPATIENT
Start: 2024-03-15 | End: 2024-03-16 | Stop reason: HOSPADM

## 2024-03-15 RX ORDER — CHOLECALCIFEROL (VITAMIN D3) 125 MCG
5 CAPSULE ORAL NIGHTLY PRN
Status: DISCONTINUED | OUTPATIENT
Start: 2024-03-15 | End: 2024-03-15

## 2024-03-15 RX ORDER — CALCIUM GLUCONATE 20 MG/ML
2000 INJECTION, SOLUTION INTRAVENOUS ONCE
Status: COMPLETED | OUTPATIENT
Start: 2024-03-15 | End: 2024-03-15

## 2024-03-15 RX ORDER — TEMAZEPAM 7.5 MG/1
15 CAPSULE ORAL NIGHTLY PRN
Status: DISCONTINUED | OUTPATIENT
Start: 2024-03-15 | End: 2024-03-16 | Stop reason: HOSPADM

## 2024-03-15 RX ORDER — AMLODIPINE BESYLATE 5 MG/1
10 TABLET ORAL
Status: DISCONTINUED | OUTPATIENT
Start: 2024-03-15 | End: 2024-03-15

## 2024-03-15 RX ORDER — BISACODYL 10 MG
10 SUPPOSITORY, RECTAL RECTAL DAILY PRN
Status: DISCONTINUED | OUTPATIENT
Start: 2024-03-15 | End: 2024-03-16 | Stop reason: HOSPADM

## 2024-03-15 RX ORDER — ACETAMINOPHEN 325 MG/1
650 TABLET ORAL EVERY 4 HOURS PRN
Status: DISCONTINUED | OUTPATIENT
Start: 2024-03-15 | End: 2024-03-16 | Stop reason: HOSPADM

## 2024-03-15 RX ORDER — HYDRALAZINE HYDROCHLORIDE 20 MG/ML
20 INJECTION INTRAMUSCULAR; INTRAVENOUS EVERY 6 HOURS PRN
Status: DISCONTINUED | OUTPATIENT
Start: 2024-03-15 | End: 2024-03-16 | Stop reason: HOSPADM

## 2024-03-15 RX ORDER — ACETAMINOPHEN 160 MG/5ML
650 SOLUTION ORAL EVERY 4 HOURS PRN
Status: DISCONTINUED | OUTPATIENT
Start: 2024-03-15 | End: 2024-03-16 | Stop reason: HOSPADM

## 2024-03-15 RX ORDER — HYDRALAZINE HYDROCHLORIDE 20 MG/ML
10 INJECTION INTRAMUSCULAR; INTRAVENOUS ONCE
Status: COMPLETED | OUTPATIENT
Start: 2024-03-15 | End: 2024-03-15

## 2024-03-15 RX ORDER — LISINOPRIL 20 MG/1
40 TABLET ORAL
Status: DISCONTINUED | OUTPATIENT
Start: 2024-03-15 | End: 2024-03-16 | Stop reason: HOSPADM

## 2024-03-15 RX ORDER — SODIUM CHLORIDE 0.9 % (FLUSH) 0.9 %
10 SYRINGE (ML) INJECTION AS NEEDED
Status: DISCONTINUED | OUTPATIENT
Start: 2024-03-15 | End: 2024-03-16 | Stop reason: HOSPADM

## 2024-03-15 RX ORDER — SODIUM CHLORIDE 0.9 % (FLUSH) 0.9 %
10 SYRINGE (ML) INJECTION EVERY 12 HOURS SCHEDULED
Status: DISCONTINUED | OUTPATIENT
Start: 2024-03-15 | End: 2024-03-16 | Stop reason: HOSPADM

## 2024-03-15 RX ORDER — LABETALOL 100 MG/1
300 TABLET, FILM COATED ORAL EVERY 12 HOURS SCHEDULED
Status: DISCONTINUED | OUTPATIENT
Start: 2024-03-15 | End: 2024-03-16 | Stop reason: HOSPADM

## 2024-03-15 RX ORDER — SODIUM CHLORIDE 9 MG/ML
40 INJECTION, SOLUTION INTRAVENOUS AS NEEDED
Status: DISCONTINUED | OUTPATIENT
Start: 2024-03-15 | End: 2024-03-16 | Stop reason: HOSPADM

## 2024-03-15 RX ADMIN — ACETAMINOPHEN 650 MG: 325 TABLET ORAL at 02:25

## 2024-03-15 RX ADMIN — BENZONATATE 100 MG: 100 CAPSULE ORAL at 00:37

## 2024-03-15 RX ADMIN — LISINOPRIL 40 MG: 20 TABLET ORAL at 09:02

## 2024-03-15 RX ADMIN — AMLODIPINE BESYLATE 10 MG: 5 TABLET ORAL at 09:02

## 2024-03-15 RX ADMIN — HYDRALAZINE HYDROCHLORIDE 10 MG: 20 INJECTION, SOLUTION INTRAMUSCULAR; INTRAVENOUS at 17:37

## 2024-03-15 RX ADMIN — Medication 10 ML: at 08:13

## 2024-03-15 RX ADMIN — HYDRALAZINE HYDROCHLORIDE 10 MG: 20 INJECTION, SOLUTION INTRAMUSCULAR; INTRAVENOUS at 20:00

## 2024-03-15 RX ADMIN — CALCIUM GLUCONATE 2000 MG: 20 INJECTION, SOLUTION INTRAVENOUS at 09:02

## 2024-03-15 RX ADMIN — BENZONATATE 100 MG: 100 CAPSULE ORAL at 10:38

## 2024-03-15 RX ADMIN — PANTOPRAZOLE SODIUM 40 MG: 40 INJECTION, POWDER, FOR SOLUTION INTRAVENOUS at 08:13

## 2024-03-15 RX ADMIN — GUAIFENESIN 600 MG: 600 TABLET ORAL at 00:36

## 2024-03-15 RX ADMIN — LABETALOL HYDROCHLORIDE 300 MG: 100 TABLET, FILM COATED ORAL at 08:13

## 2024-03-15 NOTE — CONSULTS
Psychiatric   Consult Note    Patient Name: Francisco Griffin  : 1975  MRN: [id number]  Primary Care Physician:  Provider, No Known  Referring Physician: No ref. provider found  Date of admission: 3/14/2024    Subjective   Subjective     Reason for Consult/ Chief Complaint: ESRD    HPI:  Francisco Griffin is a 48 y.o. male 48-year-old male with past medical history of ESRD on hemodialysis, history of multiple myeloma resulting in ESRD, hypertension who came into the hospital due to worsening fatigue with nausea and vomiting associated with mild blood in the sputum.  He denies any fever chills.  He continues to have a good appetite    Patient's blood pressures have been high and have been running high even during dialysis.  Hemoglobin noted to be 7.3 up from 6.5 after transfusion.  Platelets have been trending down.  Chemistries are consistent with ESRD with creatinine of 13 but BUN has been elevated at 120.  Calcium has been running low at 5.7.  LFTs are unremarkable.  Respiratory swab showed flu a positive.  Chest x-ray with concerns for some volume overload with no obvious infiltrate.  Nephrology's been consulted for ESRD management    Review of Systems  Constitutional:        Weakness tiredness fatigue  Eyes:                       No blurry vision, eye discharge, eye irritation, eye pain  HEENT:                   No acute hair loss, earache and discharge, nasal congestion or discharge, sore throat, postnasal drip  Respiratory:           No shortness of breath coughing sputum production wheezing hemoptysis pleuritic chest pain  Cardiovascular:     No chest pain, orthopnea, PND, dizziness, palpitation, lower extremity edema  Gastrointestinal:   No nausea vomiting diarrhea abdominal pain constipation  Genitourinary:       No urinary incontinence, hesitancy, frequency, urgency, dysuria  Neurological:        No confusion, headache, focal weakness, numbness, dysphasia  Hematologic:         No bruising, bleeding,  pallor, lymphadenopathy  Endocrine:            No coldness, hot flashes, polyuria, abnormal hair growth  Musculoskeletal:  No body pains, aches, arthritic pains, muscle pain ,muscle wasting  Psychiatric:          No low or high mood, anxiety, hallucinations, delusions  Skin.                      No rash, ulcers, bruising, itching    Personal History     Past Medical History:   Diagnosis Date    Renal disorder        History reviewed. No pertinent surgical history.    Family History: family history is not on file. Otherwise pertinent FHx was reviewed and not pertinent to current issue.    Social History:  reports that he has never smoked. He has never been exposed to tobacco smoke. He has never used smokeless tobacco. He reports that he does not drink alcohol and does not use drugs.    Home Medications:  NIFEdipine CC, calcium carbonate EX, labetalol, losartan, and vitamin D    Allergies:  No Known Allergies    Objective    Objective     Vitals:   Temp:  [97.9 °F (36.6 °C)-99.1 °F (37.3 °C)] 98.1 °F (36.7 °C)  Heart Rate:  [83-89] 83  Resp:  [17-20] 17  BP: (138-184)/() 182/104    Physical Exam:             Constitutional:         Awake, alert responsive, conversant, no obvious distress   Eyes:                       PERRLA, sclerae anicteric, no conjunctival injection   HEENT:                   Moist mucous membranes, no nasal or eye discharge, no throat congestion   Neck:                      Supple, no thyromegaly, no lymphadenopathy, trachea midline, no elevated JVD   Respiratory:           Clear to auscultation bilaterally, nonlabored respirations    Cardiovascular:     RRR, no murmurs, rubs, or gallops, palpable pedal pulses bilaterally, No bilateral ankle edema   Gastrointestinal:   Positive bowel sounds, soft, nontender, non-distended, no organomegaly   Musculoskeletal:  No clubbing or cyanosis to extremities, muscle wasting, joint swelling, muscle weakness   Psychiatric:              Appropriate affect,  cooperative   Neurologic:            Awake alert, oriented x 3, strength symmetric in all extremities, Cranial Nerves grossly intact to confrontation, speech clear   Skin:                      No rashes, bruising, skin ulcers, petechiae or ecchymosis    Result Review    Result Review:  I have personally reviewed the results from the time of this admission to 3/15/2024 08:20 EDT and agree with these findings:  []  Laboratory  []  Microbiology  []  Radiology  []  EKG/Telemetry   []  Cardiology/Vascular   []  Pathology  []  Old records  []  Other:    Results from last 7 days   Lab Units 03/15/24  0512 03/14/24 2025   WBC 10*3/mm3 3.76 4.24   HEMOGLOBIN g/dL 7.3* 6.5*   PLATELETS 10*3/mm3 71* 68*     Results from last 7 days   Lab Units 03/15/24  0512 03/14/24 2025   SODIUM mmol/L 132* 131*   POTASSIUM mmol/L 4.9 4.7   CHLORIDE mmol/L 94* 92*   CO2 mmol/L 15.0* 16.0*   BUN mg/dL 123* 124*   CREATININE mg/dL 12.91* 12.76*   GLUCOSE mg/dL 98 111*       Assessment & Plan   Assessment / Plan     Active Hospital Problems:  Active Hospital Problems    Diagnosis     **Symptomatic anemia     ESRD (end stage renal disease)     Primary hypertension     Influenza A      48-year-old male with past medical history of ESRD on hemodialysis, history of multiple myeloma resulting in ESRD, hypertension who came into the hospital due to worsening fatigue with nausea and vomiting associated with mild blood in the sputum with normal imaging noted to be flu positive and a hemoglobin of 6.5 status 1 unit of transfusion    Plan:   Will continue dialyze the patient on Monday Wednesday Friday while inpatient.  Plan for 1 to 2 L UF  Renal diet as tolerated  Will give 2 g of calcium  Patient is being dialyzed on high calcium bath  Patient's blood pressure regiment was recently changed to Lotrel i.e. benazepril 40 mg/amlodipine 10 mg.  Nifedipine was discontinued.  Will give lisinopril with amlodipine here  Iron profile ordered  Patient's  platelets are trending down  Peripheral smear ordered      Electronically signed by Oral Guillen MD, 03/15/24, 8:00 AM EDT.

## 2024-03-15 NOTE — PAYOR COMM NOTE
"Linh Webb (48 y.o. Male)       Date of Birth   1975    Social Security Number       Address   [address] [place] [address]    Home Phone       MRN   [id number]       [place]   [facility]    Marital Status   Single                            Admission Date   3/14/24    Admission Type   Emergency    Admitting Provider   Elmer Moraes Jr., MD    Attending Provider   Rebeca Cloud MD    Department, Room/Bed   [facility] 4TH FLOOR MEDICAL TELEMETRY UNIT, 410/1       Discharge Date       Discharge Disposition       Discharge Destination                                 Attending Provider: Rebeca Cloud MD    Allergies: No Known Allergies    Isolation: Droplet   Infection: Influenza (24)   Code Status: CPR    Ht: 177.8 cm (70\")   Wt: 68.2 kg (150 lb 5.7 oz)    Admission Cmt: None   Principal Problem: Symptomatic anemia [D64.9]                   Active Insurance as of 3/14/2024       Primary Coverage       Payor Plan Insurance Group Employer/Plan Group    [employer] BLUE [employer]OSS [employer] BLUE CROSS BLUE [employer]IELD PPO [id number]       Payor Plan Address Payor Plan Phone Number Payor Plan Fax Number Effective Dates    [address] [phone]  2021 - None Entered    [place] [address]         Subscriber Name Subscriber Birth Date Member ID       LINH WEBB 1975 [id number]                     Emergency Contacts            No emergency contacts on file.                 History & Physical        Elmer Moraes Jr., MD at [address] [facility]           [facility] HISTORY AND PHYSICAL  Date: 3/14/2024   Patient Name: Linh Webb  : 1975  MRN: [id number]  Primary Care Physician:  Provider, No Known  Date of admission: 3/14/2024    Subjective  Subjective     Chief Complaint: Fatigue    HPI:    Linh Webb is a 48 y.o. male past medical history of end-stage renal disease, multiple myeloma that presented to the emergency department for evaluation " of fatigue.  On review of systems though he did have multiple other complaints.  States over the past few days he had nausea and vomiting along with cough and blood mixed in with his sputum.  States that this had not occurred today however.  Also endorse melena over the past 2 days but no melena today.  Denied any fevers, chills, sweats, chest pain, shortness of breath, palpitations, abdominal pain, rash.  In the emergency department found to be anemic with hemoglobin 6.7 which is down from 7.5 most recently.  Also noted to be influenza positive and an elevated lipase of 477.  Patient receiving 1 unit packed red blood cell in the emergency department and will be admitted for ongoing monitoring management.      Personal History     Past Medical History:  Past Medical History:   Diagnosis Date    Renal disorder    Multiple myeloma      Past Surgical History:  History reviewed. No pertinent surgical history.  Fistula placement    Family History:   History reviewed. No pertinent family history.      Social History:   Social History     Tobacco Use    Smoking status: Never     Passive exposure: Never    Smokeless tobacco: Never   Vaping Use    Vaping status: Never Used   Substance Use Topics    Alcohol use: Never         Home Medications:  NIFEdipine CC, calcium carbonate EX, labetalol, losartan, and vitamin D    Allergies:  No Known Allergies    Review of Systems   All systems were reviewed and negative except for: Fatigue, melena, hemoptysis, malaise    Objective  Objective     Vitals:   Temp:  [97.9 °F (36.6 °C)-99.1 °F (37.3 °C)] 97.9 °F (36.6 °C)  Heart Rate:  [85-89] 85  Resp:  [17-20] 18  BP: (138-150)/(74-94) 150/94    Physical Exam    Constitutional: Awake, alert, no acute distress   Eyes: Pupils equal, sclerae anicteric, no conjunctival injection   HENT: NCAT, mucous membranes moist   Neck: Supple, no thyromegaly, no lymphadenopathy, trachea midline   Respiratory: Clear to auscultation bilaterally, nonlabored  respirations    Cardiovascular: RRR, no murmurs, rubs, or gallops, palpable pedal pulses bilaterally   Gastrointestinal: Positive bowel sounds, soft, nontender, nondistended   Musculoskeletal: No bilateral ankle edema, no clubbing or cyanosis to extremities   Psychiatric: Appropriate affect, cooperative   Neurologic: Oriented x 3, strength symmetric in all extremities, Cranial Nerves grossly intact to confrontation, speech clear   Skin: No rashes     Result Review   Result Review:  I have personally reviewed the results from the time of this admission to 3/14/2024 23:11 EDT and agree with these findings:  [x]  Laboratory  []  Microbiology  [x]  Radiology  []  EKG/Telemetry   []  Cardiology/Vascular   []  Pathology  []  Old records  []  Other:      Assessment & Plan  Assessment / Plan     Assessment/Plan:   Symptomatic anemia: Receiving 1 unit packed red blood cell in the emergency department.  Will trend H&H.  Fecal occult was negative but will empirically keep him on clear liquid diet for now pending serial H&H's.  Start Protonix as well.  He did complain of melena over the past few days but stated no melena seen today.  Monitor on telemetry.  End-stage renal disease with volume overload: Consult nephrology and appreciate recommendations.  Supplemental oxygen if needed.  Currently tolerating room air.  Monitor intake and output.  Serial labs  Hemoptysis?:  Endorsed small amounts of blood mixed in with sputum with his cough.  Will continue to monitor this for now.  Chest x-ray is clear and patient states he had no further episodes today.  Elevated lipase: Abdomen soft and nontender.  Supportive care.  Low threshold to order CT abdomen pelvis if any new signs or symptoms develop.  Influenza: Supportive care.  Supplemental oxygen if needed.  Currently tolerating room air.  Thrombocytopenia: Transfuse for platelet count less than 10,000 and less active signs of bleeding and will transfuse for platelet count less than  50,000.  Serial labs.  History of multiple myeloma: Resume any home medications once verified.  Serial labs.      DVT prophylaxis:  Mechanical DVT prophylaxis orders are signed and held.          CODE STATUS:    Code Status (Patient has no pulse and is not breathing): CPR (Attempt to Resuscitate)  Medical Interventions (Patient has pulse or is breathing): Full Support      Admission Status:  I believe this patient meets inpatient status.    Electronically signed by Elmer Moraes Jr, MD, 03/14/24, 11:11 PM EDT.             Electronically signed by Elmer Moraes Jr., MD at 03/14/24 2317          Emergency Department Notes        Lidya Barnes APRN at 03/14/24 1946       Summary:22                 Time: 7:46 PM EDT  Date of encounter:  3/14/2024  Independent Historian/Clinical History and Information was obtained by:   Patient    History is limited by: N/A    Chief Complaint: Watery eyes, diarrhea      History of Present Illness:  Patient is a 48 y.o. year old male who presents to the emergency department for evaluation of diarrhea for the past 3 days.  Patient states diarrhea has now resolved.  Patient states that he developed a cough as well as eye swelling yesterday.  Also complains of significant fatigue.  States his eyes have been watery and vision has been blurry due to excessive tearing.  Patient denies nausea/vomiting, fever/chills.  Patient states today is the first day he has not had diarrhea.  Patient denies recent antibiotic use.  He admits to black stools in the past few days.  Patient is on hemodialysis Monday Wednesday Friday, last dialysis was on Monday.  Patient states he missed dialysis yesterday.    HPI    Patient Care Team  Primary Care Provider: Provider, No Known    Past Medical History:     No Known Allergies  Past Medical History:   Diagnosis Date    Renal disorder      History reviewed. No pertinent surgical history.  History reviewed. No pertinent family history.    Home  "Medications:  Prior to Admission medications    Medication Sig Start Date End Date Taking? Authorizing Provider   calcium carbonate EX (TUMS EX) 750 MG chewable tablet Chew 1 tablet.    Maureen Looney MD   labetalol (NORMODYNE) 300 MG tablet Take 1 tablet by mouth Every 12 (Twelve) Hours. 12/31/23   Maureen Looney MD   losartan (COZAAR) 25 MG tablet Take 1 tablet by mouth Daily. 12/25/23   Maureen Looney MD   NIFEdipine CC (ADALAT CC) 30 MG 24 hr tablet TAKE 2 TABELTS BY MOUTH TWICE DAILY 2/23/24   Maureen Looney MD   vitamin D (ERGOCALCIFEROL) 1.25 MG (21162 UT) capsule capsule Take 1 capsule by mouth 1 (One) Time Per Week. 12/23/23   Maureen Looney MD        Social History:   Social History     Tobacco Use    Smoking status: Never     Passive exposure: Never    Smokeless tobacco: Never   Vaping Use    Vaping status: Never Used   Substance Use Topics    Alcohol use: Never    Drug use: Never         Review of Systems:  Review of Systems   Constitutional:  Negative for chills and fever.   HENT:  Negative for congestion, ear pain and sore throat.    Eyes:  Positive for discharge. Negative for pain.   Respiratory:  Positive for cough. Negative for chest tightness and shortness of breath.    Cardiovascular:  Negative for chest pain.   Gastrointestinal:  Positive for blood in stool (Black stool) and diarrhea. Negative for abdominal pain, nausea and vomiting.   Genitourinary:  Negative for dysuria, flank pain and hematuria.   Musculoskeletal:  Negative for joint swelling.   Skin:  Negative for pallor.   Neurological:  Negative for seizures and headaches.   All other systems reviewed and are negative.       Physical Exam:  /96 (BP Location: Right arm, Patient Position: Sitting)   Pulse 88   Temp 98.4 °F (36.9 °C) (Oral)   Resp 18   Ht 177.8 cm (70\")   Wt 68.2 kg (150 lb 5.7 oz)   SpO2 100%   BMI 21.57 kg/m²     Physical Exam  Vitals and nursing note reviewed. "   Constitutional:       General: He is not in acute distress.     Appearance: Normal appearance. He is ill-appearing. He is not toxic-appearing.   HENT:      Head: Normocephalic and atraumatic.      Mouth/Throat:      Mouth: Mucous membranes are moist.   Eyes:      General: No scleral icterus.     Extraocular Movements: Extraocular movements intact.      Conjunctiva/sclera:      Right eye: Chemosis present.      Left eye: Chemosis present.   Cardiovascular:      Rate and Rhythm: Normal rate and regular rhythm.      Pulses:           Radial pulses are 2+ on the right side and 2+ on the left side.      Heart sounds: Normal heart sounds.   Pulmonary:      Effort: Pulmonary effort is normal. No respiratory distress.      Breath sounds: Normal breath sounds.   Abdominal:      General: Abdomen is flat. Bowel sounds are normal.      Palpations: Abdomen is soft.      Tenderness: There is no abdominal tenderness.   Musculoskeletal:         General: Normal range of motion.      Cervical back: Normal range of motion and neck supple.      Right lower le+      Left lower le+   Skin:     General: Skin is warm and dry.   Neurological:      Mental Status: He is alert and oriented to person, place, and time. Mental status is at baseline.                Procedures:  Procedures      Medical Decision Making:      Comorbidities that affect care:    Chronic Kidney Disease    External Notes reviewed:    Previous ED Note: 2024      The following orders were placed and all results were independently analyzed by me:  Orders Placed This Encounter   Procedures    COVID-19, FLU A/B, RSV PCR 1 HR TAT - Swab, Nasopharynx    XR Chest 2 View    [place] Draw    Comprehensive Metabolic Panel    Lipase    Urinalysis With Microscopic If Indicated (No Culture) - Urine, Clean Catch    CBC Auto Differential    Occult Blood, Fecal By Immunoassay - Stool, Per Rectum    Urinalysis, Microscopic Only - Urine, Clean Catch    CBC (No Diff)     Comprehensive Metabolic Panel    Diet: Liquid; Clear Liquid; Fluid Consistency: Thin (IDDSI 0)    Undress & Gown    Ice pack or cool rag to eyes  Misc Nursing Order (Specify)    Verify Informed Consent for Blood Product Administration    Vital Signs Recheck    Vital Signs Recheck    Vital Signs    Intake & Output    Weigh Patient    Oral Care    Place Sequential Compression Device    Maintain Sequential Compression Device    Telemetry - Maintain IV Access    Telemetry - Place Orders & Notify Provider of Results When Patient Experiences Acute Chest Pain, Dysrhythmia or Respiratory Distress    Up With Assistance    Notify Provider (With Default Parameters)    Code Status and Medical Interventions:    Hospitalist (on-call MD unless specified)    Inpatient Nephrology Consult    Prepare RBC, 2 Units    Type & Screen    ABO RH Specimen Verification    Prepare RBC, 1 Units    Insert Peripheral IV    Insert Peripheral IV    Access VAD    Inpatient Admission    CBC & Differential    Green Top (Gel)    Lavender Top    Gold Top - SST    Light Blue Top       Medications Given in the Emergency Department:  Medications   sodium chloride 0.9 % flush 10 mL (has no administration in time range)   sodium chloride 0.9 % flush 10 mL (10 mL Intravenous Not Given 3/15/24 0041)   sodium chloride 0.9 % flush 10 mL (has no administration in time range)   sodium chloride 0.9 % infusion 40 mL (has no administration in time range)   nitroglycerin (NITROSTAT) SL tablet 0.4 mg (has no administration in time range)   acetaminophen (TYLENOL) tablet 650 mg (has no administration in time range)     Or   acetaminophen (TYLENOL) 160 MG/5ML oral solution 650 mg (has no administration in time range)     Or   acetaminophen (TYLENOL) suppository 650 mg (has no administration in time range)   sennosides-docusate (PERICOLACE) 8.6-50 MG per tablet 2 tablet (has no administration in time range)     And   polyethylene glycol (MIRALAX) packet 17 g (has no  administration in time range)     And   bisacodyl (DULCOLAX) EC tablet 5 mg (has no administration in time range)     And   bisacodyl (DULCOLAX) suppository 10 mg (has no administration in time range)   sodium chloride 0.9 % flush 10 mL (has no administration in time range)   pantoprazole (PROTONIX) injection 40 mg (40 mg Intravenous Not Given 3/15/24 0041)   benzonatate (TESSALON) capsule 100 mg (100 mg Oral Given 3/15/24 0037)   guaiFENesin (MUCINEX) 12 hr tablet 600 mg (600 mg Oral Given 3/15/24 0036)        ED Course:         Labs:    Lab Results (last 24 hours)       Procedure Component Value Units Date/Time    POC Influenza A/B [[id number]]  (Abnormal) Collected: 03/14/24 1819    Specimen: Swab Updated: 03/14/24 1820     Rapid Influenza A Ag Positive     Rapid Influenza B Ag Negative     Internal Control Passed     Lot Number [id number]     Expiration Date 12,082,025    POCT SARS-CoV-2 Antigen NEGAR   (Pack UCs) [[id number]] Collected: 03/14/24 1824    Specimen: Swab Updated: 03/14/24 1824     SARS Antigen Not Detected     Internal Control Passed     Lot Number [id number]     Expiration Date 7,032,024    CBC & Differential [[id number]]  (Abnormal) Collected: 03/14/24 2025    Specimen: Blood Updated: 03/14/24 2039    Narrative:      The following orders were created for panel order CBC & Differential.  Procedure                               Abnormality         Status                     ---------                               -----------         ------                     CBC Auto Differential[[id number]]        Abnormal            Final result                 Please view results for these tests on the individual orders.    Comprehensive Metabolic Panel [[id number]]  (Abnormal) Collected: 03/14/24 2025    Specimen: Blood Updated: 03/14/24 2105     Glucose 111 mg/dL       mg/dL      Creatinine 12.76 mg/dL      Sodium 131 mmol/L      Potassium 4.7 mmol/L      Chloride 92 mmol/L      CO2 16.0 mmol/L      Calcium  6.1 mg/dL      Total Protein 5.5 g/dL      Albumin 2.9 g/dL      ALT (SGPT) 41 U/L      AST (SGOT) 47 U/L      Alkaline Phosphatase 79 U/L      Total Bilirubin 0.3 mg/dL      Globulin 2.6 gm/dL      A/G Ratio 1.1 g/dL      BUN/Creatinine Ratio 9.7     Anion Gap 23.0 mmol/L      eGFR 4.4 mL/min/1.73      Comment: <15 Indicative of kidney failure       Narrative:      GFR Normal >60  Chronic Kidney Disease <60  Kidney Failure <15      Lipase [[id number]]  (Abnormal) Collected: 03/14/24 2025    Specimen: Blood Updated: 03/14/24 2105     Lipase 477 U/L     CBC Auto Differential [[id number]]  (Abnormal) Collected: 03/14/24 2025    Specimen: Blood Updated: 03/14/24 2039     WBC 4.24 10*3/mm3      RBC 1.97 10*6/mm3      Hemoglobin 6.5 g/dL      Hematocrit 18.5 %      MCV 93.9 fL      MCH 33.0 pg      MCHC 35.1 g/dL      RDW 14.4 %      RDW-SD 48.4 fl      MPV 9.3 fL      Platelets 68 10*3/mm3      Neutrophil % 65.6 %      Lymphocyte % 20.0 %      Monocyte % 6.6 %      Eosinophil % 7.1 %      Basophil % 0.2 %      Immature Grans % 0.5 %      Neutrophils, Absolute 2.78 10*3/mm3      Lymphocytes, Absolute 0.85 10*3/mm3      Monocytes, Absolute 0.28 10*3/mm3      Eosinophils, Absolute 0.30 10*3/mm3      Basophils, Absolute 0.01 10*3/mm3      Immature Grans, Absolute 0.02 10*3/mm3      nRBC 0.0 /100 WBC     Urinalysis With Microscopic If Indicated (No Culture) - Urine, Clean Catch [[id number]]  (Abnormal) Collected: 03/14/24 2049    Specimen: Urine, Clean Catch Updated: 03/14/24 2110     Color, UA Yellow     Appearance, UA Clear     pH, UA 7.5     Specific Gravity, UA 1.012     Glucose,  mg/dL (Trace)     Ketones, UA Negative     Bilirubin, UA Negative     Blood, UA Small (1+)     Protein, UA >=300 mg/dL (3+)     Leuk Esterase, UA Negative     Nitrite, UA Negative     Urobilinogen, UA 0.2 E.U./dL    Urinalysis, Microscopic Only - Urine, Clean Catch [[id number]] Collected: 03/14/24 2049    Specimen: Urine, Clean Catch Updated:  03/14/24 2132     RBC, UA 0-2 /HPF      WBC, UA 0-2 /HPF      Bacteria, UA None Seen /HPF      Squamous Epithelial Cells, UA None Seen /HPF      Hyaline Casts, UA None Seen /LPF      Methodology Manual Light Microscopy    Occult Blood, Fecal By Immunoassay - Stool, Per Rectum [[id number]]  (Normal) Collected: 03/14/24 2058    Specimen: Stool from Per Rectum Updated: 03/14/24 2121     Occult Blood, Fecal by Immunoassay Negative    COVID-19, FLU A/B, RSV PCR 1 HR TAT - Swab, Nasopharynx [[id number]]  (Abnormal) Collected: 03/14/24 2058    Specimen: Swab from Nasopharynx Updated: 03/14/24 2152     COVID19 Not Detected     Influenza A PCR Detected     Influenza B PCR Not Detected     RSV, PCR Not Detected    Narrative:      Fact sheet for providers: https://www.fda.gov/media/246421/download    Fact sheet for patients: https://www.fda.gov/media/633806/download    Test performed by PCR.             Imaging:    XR Chest 2 View    Result Date: 3/14/2024  PROCEDURE: XR CHEST 2 VW  COMPARISON: None  INDICATIONS: Cough, fatigue  chest congestion weak  FINDINGS:  There is moderate perihilar airspace opacity bilaterally.  Airspace opacity is also noted in the lung bases.  Mild cardiomegaly is noted.  No significant effusion is seen.  A left jugular port infusion catheter is in good position.        1. Findings most suggestive of moderate congestive heart failure.  Superimposed pneumonia, aspiration or pulmonary hemorrhage is not excluded.     Dandy Elizondo M.D.       Electronically Signed and Approved By: Dandy Elizondo M.D. on 3/14/2024 at 21:28                Differential Diagnosis and Discussion:    Cough: Differential diagnosis includes but is not limited to pneumonia, acute bronchitis, upper respiratory infection, ACE inhibitor use, allergic reaction, epiglottitis, seasonal allergies, chemical irritants, exercise-induced asthma, viral syndrome.  Diarrhea: Differential diagnosis includes but is not limited to malabsorption  syndrome, bacterial infection, carcinoid syndrome, pancreatic hypersecretion, viral infection, celiac sprue, Crohn's disease, ulcerative colitis, ischemic colitis, colitis, hypermotility, and irritable bowel syndrome.  Eye Pain/Blurred Vision: Differential diagnosis includes but is not limited to dacryocystitis, hordeolum, chalazion, periorbital cellulitis, cavernous sinus thrombosis, blepharitis, and glaucoma.    All labs were reviewed and interpreted by me.    MDM  Number of Diagnoses or Management Options  Anemia, unspecified type  Chemosis of conjunctiva of both eyes  Chronic kidney disease, unspecified CKD stage  Influenza A  Diagnosis management comments: Patient presented with a 3-day history of diarrhea, also severe fatigue and cough for 2 days.  Missed dialysis yesterday, M/W/F.  The patient's hemoglobin was 6.5.  The patient was symptomatic (fatigue) and will be transfused with 1 unit of PRBC.  Patient appears to be fluid overloaded with chest x-ray showing pulmonary edema, bilateral chemosis as well is worsening pedal edema.  Additionally the patient was positive for influenza A.  Patient will be admitted for hemodialysis as well as further management of anemia.       Amount and/or Complexity of Data Reviewed  Clinical lab tests: reviewed and ordered  Decide to obtain previous medical records or to obtain history from someone other than the patient: yes    Risk of Complications, Morbidity, and/or Mortality  Presenting problems: moderate  Diagnostic procedures: moderate  Management options: moderate    Patient Progress  Patient progress: stable                 Patient Care Considerations:    SEPSIS was considered but is NOT present in the emergency department as SIRS criteria is not present.      Consultants/Shared Management Plan:    Hospitalist: I have discussed the case with Dr. Moraes who agrees to accept the patient for admission.    Social Determinants of Health:    Patient is independent, reliable,  and has access to care.       Disposition and Care Coordination:    Admit:   Through independent evaluation of the patient's history, physical, and imperical data, the patient meets criteria for inpatient admission to the hospital.        Final diagnoses:   Influenza A   Anemia, unspecified type   Chemosis of conjunctiva of both eyes   Chronic kidney disease, unspecified CKD stage        ED Disposition       ED Disposition   Decision to Admit    Condition   --    Comment   Level of Care: Telemetry [5]  Diagnosis: Symptomatic anemia [[id number]]  Certification: I Certify That Inpatient Hospital Services Are Medically Necessary For Greater Than 2 Midnights                 This medical record created using voice recognition software.             Lidya Barnes APRN  03/15/24 0128      Electronically signed by Lidya Barnes APRN at 03/15/24 0128          Physician Progress Notes (last 24 hours)        Rebeca Cloud MD at 03/15/24 0843           [facility] Emergencyist Progress Note  Date: 3/15/2024  Patient Name: Francisco Griffin  : 1975  MRN: [id number]  Date of admission: 3/14/2024      Subjective   Subjective     Chief Complaint: Fatigue    Summary: Francisco Griffin is a 48 y.o. male past medical history of end-stage renal disease, multiple myeloma that presented to the emergency department for evaluation of fatigue.  On review of systems though he did have multiple other complaints.  States over the past few days he had nausea and vomiting along with cough and blood mixed in with his sputum.  States that this had not occurred today however.  Also endorse melena over the past 2 days but no melena today.  Denied any fevers, chills, sweats, chest pain, shortness of breath, palpitations, abdominal pain, rash.  In the emergency department found to be anemic with hemoglobin 6.7 which is down from 7.5 most recently.  Also noted to be influenza positive and an elevated lipase of 477.  Patient receiving 1 unit  packed red blood cell in the emergency department and will be admitted for ongoing monitoring management.     Interval Followup: Patient seen and evaluated this morning.  He denies any further melena.  He denies any further nausea/ vomiting or cough, including hemoptysis.  His main complaint is that he is just tired and did not sleep well last night.    Review of Systems   All systems were reviewed and negative except for: As noted in interval follow-up.  Objective   Objective     Vitals:   Temp:  [97.9 °F (36.6 °C)-99.1 °F (37.3 °C)] 98.1 °F (36.7 °C)  Heart Rate:  [83-89] 83  Resp:  [17-20] 17  BP: (138-184)/() 182/104  Physical Exam    Constitutional: Sitting up on the side of the bed, drowsy appearing but appropriate in his conversation, no acute distress              Eyes: Pupils equal, sclerae anicteric, no conjunctival injection              HENT: NCAT, mucous membranes moist              Neck: Supple              Respiratory: Clear to auscultation bilaterally, nonlabored respirations               Cardiovascular: RRR, no appreciable murmurs, palpable pedal pulses bilaterally              Gastrointestinal: Positive bowel sounds, soft, nontender, nondistended              Musculoskeletal: No bilateral ankle edema, no clubbing or cyanosis to extremities              Psychiatric: Appropriate affect, cooperative              Neurologic: Oriented x 3, strength symmetric in all extremities, Cranial Nerves grossly intact to confrontation, speech clear    Result Review    Result Review:  I have personally reviewed the results from the time of this admission to 3/15/2024 08:43 EDT and agree with these findings:  [x]  Laboratory  [x]  Microbiology  [x]  Radiology  []  EKG/Telemetry   []  Cardiology/Vascular   []  Pathology  []  Old records  []  Other:    Assessment & Plan   Assessment / Plan     Assessment/Plan:  Symptomatic anemia: Status post 1 unit packed red blood cell in the emergency department.  Will trend  H&H.  Globin 7.3 posttransfusion.  GI consulted.  Originally plans for EGD on this morning however able to be coordinated given patient's need for dialysis on today as well.  Continue IV Protonix .  Monitor on telemetry.  End-stage renal disease with volume overload: Nephrology consult noted.  Plans for hemodialysis on today.  Supplemental oxygen if needed.  Currently continue oxygenation on room air.  Monitor intake and output.  Serial labs  Hemoptysis?:  Endorsed small amounts of blood mixed in with sputum with his cough.  Will continue to monitor this for now.  Chest x-ray is clear and patient states he had no further episodes.  Elevated lipase: Abdomen soft and nontender.  Supportive care.  Low threshold to order CT abdomen pelvis if any new signs or symptoms develop.  Influenza A: Supportive care.  Supplemental oxygen if needed.  Currently tolerating room air.  Thrombocytopenia: Transfuse for platelet count less than 10,000 and less active signs of bleeding and will transfuse for platelet count less than 50,000.  Serial labs.  History of multiple myeloma: Resume any home medications once verified.  Serial labs.     Discussed plan with RN.    DVT prophylaxis:  Mechanical DVT prophylaxis orders are present.        CODE STATUS:   Code Status (Patient has no pulse and is not breathing): CPR (Attempt to Resuscitate)  Medical Interventions (Patient has pulse or is breathing): Full Support        Electronically signed by Rebeca Cloud MD, 03/15/24, 8:43 AM EDT.             Electronically signed by Rebeca Cloud MD at 03/15/24 1240          Consult Notes (last 24 hours)        Parth Wilcox MD at 03/15/24 1103        Consult Orders    1. Inpatient Gastroenterology Consult [[id number]] ordered by Rebeca Cloud MD at 03/15/24 0836                 [facility]  Initial Inpatient Consult Note    Referring Provider: Aleisha    Reason for Consultation:  Anemia    Subjective     History of present illness:    48 y.o. male with a past medical history of ESRD on hemodialysis-Monday Wednesday and Friday, hypertension, and history of multiple myeloma resulting in ESRD who presented to the hospital 3/14/2024 due to worsening fatigue.  His hemoglobin when he arrived in the emergency room was 6.5, today it is 7.3.  He says that his symptoms began on Tuesday, which included nausea and vomiting along with cough-scant amount of blood mixed with sputum.  He also states he has had liquid bowel movements that are black in color for the last 4-5 days.  Patient says he has had no nausea or vomiting or black stool since yesterday.  Patient denies abdominal pain, fevers, hematochezia, NSAID/thinner use, and new medications.    03/15/2024  Hgb-7.3  HCT-21.9  Platelets-71  Fecal occult-negative  Lipase-477  Sodium-132  Calcium-5.7  BUN-123  Creatinine-12.91  eGFR-4.4    Unable to locate and previous EGD/Colonoscopy reports    Past Medical History:  Past Medical History:   Diagnosis Date    Renal disorder      Past Surgical History:  History reviewed. No pertinent surgical history.   Social History:   Social History     Tobacco Use    Smoking status: Never     Passive exposure: Never    Smokeless tobacco: Never   Substance Use Topics    Alcohol use: Never      Family History:  History reviewed. No pertinent family history.    Home Meds:  Medications Prior to Admission   Medication Sig Dispense Refill Last Dose    calcium carbonate EX (TUMS EX) 750 MG chewable tablet Chew 1 tablet.       labetalol (NORMODYNE) 300 MG tablet Take 1 tablet by mouth Every 12 (Twelve) Hours.       losartan (COZAAR) 25 MG tablet Take 1 tablet by mouth Daily.       NIFEdipine CC (ADALAT CC) 30 MG 24 hr tablet TAKE 2 TABELTS BY MOUTH TWICE DAILY       vitamin D (ERGOCALCIFEROL) 1.25 MG (82539 UT) capsule capsule Take 1 capsule by mouth 1 (One) Time Per Week.        Current Meds:   amLODIPine, 10 mg, Oral,  Q24H  labetalol, 300 mg, Oral, Q12H  lisinopril, 40 mg, Oral, Q24H  pantoprazole, 40 mg, Intravenous, Q12H  sodium chloride, 10 mL, Intravenous, Q12H      Allergies:  No Known Allergies  Review of Systems  Pertinent items are noted in HPI     Objective     Vital Signs  Temp:  [97.9 °F (36.6 °C)-99.1 °F (37.3 °C)] 98.1 °F (36.7 °C)  Heart Rate:  [83-89] 83  Resp:  [17-20] 17  BP: (138-184)/() 182/104  Physical Exam:  General Appearance:    Alert, cooperative, in no acute distress   Head:    Normocephalic, without obvious abnormality, atraumatic   Eyes:          conjunctivae and sclerae normal, no icterus   Throat:   no thrush, oral mucosa moist   Neck:   Supple, no adenopathy   Lungs:     Unlabored breathing    Heart:    Regular rhythm and normal rate    Chest Wall:    No abnormalities observed   Abdomen:     Soft, non distended, non tender   Extremities:   no edema, no redness   Skin:   No bruising or rash   Psychiatric:  normal mood and insight     Results Review:   I reviewed the patient's new clinical results.    Results from last 7 days   Lab Units 03/15/24  0512 03/14/24 2025   WBC 10*3/mm3 3.76 4.24   HEMOGLOBIN g/dL 7.3* 6.5*   HEMATOCRIT % 21.9* 18.5*   PLATELETS 10*3/mm3 71* 68*     Results from last 7 days   Lab Units 03/15/24  0512 03/14/24 2025   SODIUM mmol/L 132* 131*   POTASSIUM mmol/L 4.9 4.7   CHLORIDE mmol/L 94* 92*   CO2 mmol/L 15.0* 16.0*   BUN mg/dL 123* 124*   CREATININE mg/dL 12.91* 12.76*   CALCIUM mg/dL 5.7* 6.1*   BILIRUBIN mg/dL 0.3 0.3   ALK PHOS U/L 61 79   ALT (SGPT) U/L 33 41   AST (SGOT) U/L 38 47*   GLUCOSE mg/dL 98 111*         Lab Results   Lab Value Date/Time    LIPASE 477 (H) 03/14/2024 2025       Radiology:  XR Chest 2 View    Result Date: 3/14/2024    1. Findings most suggestive of moderate congestive heart failure.  Superimposed pneumonia, aspiration or pulmonary hemorrhage is not excluded.     Dandy Elizondo M.D.       Electronically Signed and Approved By: Dandy  LENO Elizondo on 3/14/2024 at 21:28               Assessment & Plan     Symptomatic anemia    ESRD (end stage renal disease)    Primary hypertension    Influenza A       Assessment:  Anemia  End-stage renal disease  Hematemesis  Thrombocytopenia    Plan:  Patient arrived in emergency room hemoglobin was at 6.5-received 1 unit PRBC's in emergency department bringing his hemoglobin up to 7.3  Discussed with patient about proceeding with EGD this afternoon to evaluate anemia-patient is agreeable-he did have Jell-O and an Italian ice cup around 845 so procedure will be performed 6 hours from then per anesthesia  Continue to monitor H&H  Fecal occult was negative  Continue to monitor platelets and if less than 50,000 transfuse  Patient understands and agrees to the plan      I discussed the patients findings and my recommendations with patient and nursing staff.    Electronically signed by BELTRAN Diego, 03/15/24, 11:03 AM EDT.    Attending Attestation  I reviewed the below documentation and evaluation and discussed the care plan with BELTRAN Diego, I agree with her findings and plan as documented.    Patient had eaten this morning but also needing hemodialysis this afternoon and therefore EGD was canceled.  He is hemodynamically stable without evidence of ongoing GI bleeding.  We will therefore continue PPI and monitor his hemoglobin.  Plan for EGD on Monday    Electronically signed by Parth Wilcox MD, 03/15/24, 4:40 PM EDT.    Portions of this documentation were transcribed electronically from a voice recognition software.  I confirm all data accurately represents the service(s) I performed at today's visit.           Electronically signed by Parth Wilcox MD at 03/15/24 7184       Oral Guillen MD at 03/15/24 1278        Consult Orders    1. Inpatient Nephrology Consult [[id number]] ordered by Elmer Moraes Jr., MD at 03/14/24 4871                   [facility]   Consult  Note    Patient Name: Francisco Griffni  : 1975  MRN: [id number]  Primary Care Physician:  Provider, No Known  Referring Physician: No ref. provider found  Date of admission: 3/14/2024    Subjective   Subjective     Reason for Consult/ Chief Complaint: ESRD    HPI:  Francisco Griffin is a 48 y.o. male 48-year-old male with past medical history of ESRD on hemodialysis, history of multiple myeloma resulting in ESRD, hypertension who came into the hospital due to worsening fatigue with nausea and vomiting associated with mild blood in the sputum.  He denies any fever chills.  He continues to have a good appetite    Patient's blood pressures have been high and have been running high even during dialysis.  Hemoglobin noted to be 7.3 up from 6.5 after transfusion.  Platelets have been trending down.  Chemistries are consistent with ESRD with creatinine of 13 but BUN has been elevated at 120.  Calcium has been running low at 5.7.  LFTs are unremarkable.  Respiratory swab showed flu a positive.  Chest x-ray with concerns for some volume overload with no obvious infiltrate.  Nephrology's been consulted for ESRD management    Review of Systems  Constitutional:        Weakness tiredness fatigue  Eyes:                       No blurry vision, eye discharge, eye irritation, eye pain  HEENT:                   No acute hair loss, earache and discharge, nasal congestion or discharge, sore throat, postnasal drip  Respiratory:           No shortness of breath coughing sputum production wheezing hemoptysis pleuritic chest pain  Cardiovascular:     No chest pain, orthopnea, PND, dizziness, palpitation, lower extremity edema  Gastrointestinal:   No nausea vomiting diarrhea abdominal pain constipation  Genitourinary:       No urinary incontinence, hesitancy, frequency, urgency, dysuria  Neurological:        No confusion, headache, focal weakness, numbness, dysphasia  Hematologic:         No bruising, bleeding, pallor,  lymphadenopathy  Endocrine:            No coldness, hot flashes, polyuria, abnormal hair growth  Musculoskeletal:  No body pains, aches, arthritic pains, muscle pain ,muscle wasting  Psychiatric:          No low or high mood, anxiety, hallucinations, delusions  Skin.                      No rash, ulcers, bruising, itching    Personal History     Past Medical History:   Diagnosis Date    Renal disorder        History reviewed. No pertinent surgical history.    Family History: family history is not on file. Otherwise pertinent FHx was reviewed and not pertinent to current issue.    Social History:  reports that he has never smoked. He has never been exposed to tobacco smoke. He has never used smokeless tobacco. He reports that he does not drink alcohol and does not use drugs.    Home Medications:  NIFEdipine CC, calcium carbonate EX, labetalol, losartan, and vitamin D    Allergies:  No Known Allergies    Objective    Objective     Vitals:   Temp:  [97.9 °F (36.6 °C)-99.1 °F (37.3 °C)] 98.1 °F (36.7 °C)  Heart Rate:  [83-89] 83  Resp:  [17-20] 17  BP: (138-184)/() 182/104    Physical Exam:             Constitutional:         Awake, alert responsive, conversant, no obvious distress   Eyes:                       PERRLA, sclerae anicteric, no conjunctival injection   HEENT:                   Moist mucous membranes, no nasal or eye discharge, no throat congestion   Neck:                      Supple, no thyromegaly, no lymphadenopathy, trachea midline, no elevated JVD   Respiratory:           Clear to auscultation bilaterally, nonlabored respirations    Cardiovascular:     RRR, no murmurs, rubs, or gallops, palpable pedal pulses bilaterally, No bilateral ankle edema   Gastrointestinal:   Positive bowel sounds, soft, nontender, non-distended, no organomegaly   Musculoskeletal:  No clubbing or cyanosis to extremities, muscle wasting, joint swelling, muscle weakness   Psychiatric:              Appropriate affect,  cooperative   Neurologic:            Awake alert, oriented x 3, strength symmetric in all extremities, Cranial Nerves grossly intact to confrontation, speech clear   Skin:                      No rashes, bruising, skin ulcers, petechiae or ecchymosis    Result Review    Result Review:  I have personally reviewed the results from the time of this admission to 3/15/2024 08:20 EDT and agree with these findings:  []  Laboratory  []  Microbiology  []  Radiology  []  EKG/Telemetry   []  Cardiology/Vascular   []  Pathology  []  Old records  []  Other:    Results from last 7 days   Lab Units 03/15/24  0512 03/14/24 2025   WBC 10*3/mm3 3.76 4.24   HEMOGLOBIN g/dL 7.3* 6.5*   PLATELETS 10*3/mm3 71* 68*     Results from last 7 days   Lab Units 03/15/24  0512 03/14/24 2025   SODIUM mmol/L 132* 131*   POTASSIUM mmol/L 4.9 4.7   CHLORIDE mmol/L 94* 92*   CO2 mmol/L 15.0* 16.0*   BUN mg/dL 123* 124*   CREATININE mg/dL 12.91* 12.76*   GLUCOSE mg/dL 98 111*       Assessment & Plan   Assessment / Plan     Active Hospital Problems:  Active Hospital Problems    Diagnosis     **Symptomatic anemia     ESRD (end stage renal disease)     Primary hypertension     Influenza A      48-year-old male with past medical history of ESRD on hemodialysis, history of multiple myeloma resulting in ESRD, hypertension who came into the hospital due to worsening fatigue with nausea and vomiting associated with mild blood in the sputum with normal imaging noted to be flu positive and a hemoglobin of 6.5 status 1 unit of transfusion    Plan:   Will continue dialyze the patient on Monday Wednesday Friday while inpatient.  Plan for 1 to 2 L UF  Renal diet as tolerated  Will give 2 g of calcium  Patient is being dialyzed on high calcium bath  Patient's blood pressure regiment was recently changed to Lotrel i.e. benazepril 40 mg/amlodipine 10 mg.  Nifedipine was discontinued.  Will give lisinopril with amlodipine here  Iron profile ordered  Patient's  platelets are trending down  Peripheral smear ordered      Electronically signed by Oral Guillen MD, 03/15/24, 8:00 AM EDT.         Electronically signed by Oral Guillen MD at 03/15/24 0820

## 2024-03-15 NOTE — PROGRESS NOTES
[facility]   Hospitalist Progress Note  Date: 3/15/2024  Patient Name: Francisco Griffin  : 1975  MRN: [id number]  Date of admission: 3/14/2024      Subjective   Subjective     Chief Complaint: Fatigue    Summary: Francisco Griffin is a 48 y.o. male past medical history of end-stage renal disease, multiple myeloma that presented to the emergency department for evaluation of fatigue.  On review of systems though he did have multiple other complaints.  States over the past few days he had nausea and vomiting along with cough and blood mixed in with his sputum.  States that this had not occurred today however.  Also endorse melena over the past 2 days but no melena today.  Denied any fevers, chills, sweats, chest pain, shortness of breath, palpitations, abdominal pain, rash.  In the emergency department found to be anemic with hemoglobin 6.7 which is down from 7.5 most recently.  Also noted to be influenza positive and an elevated lipase of 477.  Patient receiving 1 unit packed red blood cell in the emergency department and will be admitted for ongoing monitoring management.     Interval Followup: Patient seen and evaluated this morning.  He denies any further melena.  He denies any further nausea/ vomiting or cough, including hemoptysis.  His main complaint is that he is just tired and did not sleep well last night.    Review of Systems   All systems were reviewed and negative except for: As noted in interval follow-up.  Objective   Objective     Vitals:   Temp:  [97.9 °F (36.6 °C)-99.1 °F (37.3 °C)] 98.1 °F (36.7 °C)  Heart Rate:  [83-89] 83  Resp:  [17-20] 17  BP: (138-184)/() 182/104  Physical Exam    Constitutional: Sitting up on the side of the bed, drowsy appearing but appropriate in his conversation, no acute distress              Eyes: Pupils equal, sclerae anicteric, no conjunctival injection              HENT: NCAT, mucous membranes moist              Neck: Supple              Respiratory: Clear to  auscultation bilaterally, nonlabored respirations               Cardiovascular: RRR, no appreciable murmurs, palpable pedal pulses bilaterally              Gastrointestinal: Positive bowel sounds, soft, nontender, nondistended              Musculoskeletal: No bilateral ankle edema, no clubbing or cyanosis to extremities              Psychiatric: Appropriate affect, cooperative              Neurologic: Oriented x 3, strength symmetric in all extremities, Cranial Nerves grossly intact to confrontation, speech clear    Result Review    Result Review:  I have personally reviewed the results from the time of this admission to 3/15/2024 08:43 EDT and agree with these findings:  [x]  Laboratory  [x]  Microbiology  [x]  Radiology  []  EKG/Telemetry   []  Cardiology/Vascular   []  Pathology  []  Old records  []  Other:    Assessment & Plan   Assessment / Plan     Assessment/Plan:  Symptomatic anemia: Status post 1 unit packed red blood cell in the emergency department.  Will trend H&H.  Globin 7.3 posttransfusion.  GI consulted.  Originally plans for EGD on this morning however able to be coordinated given patient's need for dialysis on today as well.  Continue IV Protonix .  Monitor on telemetry.  End-stage renal disease with volume overload: Nephrology consult noted.  Plans for hemodialysis on today.  Supplemental oxygen if needed.  Currently continue oxygenation on room air.  Monitor intake and output.  Serial labs  Hemoptysis?:  Endorsed small amounts of blood mixed in with sputum with his cough.  Will continue to monitor this for now.  Chest x-ray is clear and patient states he had no further episodes.  Elevated lipase: Abdomen soft and nontender.  Supportive care.  Low threshold to order CT abdomen pelvis if any new signs or symptoms develop.  Influenza A: Supportive care.  Supplemental oxygen if needed.  Currently tolerating room air.  Thrombocytopenia: Transfuse for platelet count less than 10,000 and less active  signs of bleeding and will transfuse for platelet count less than 50,000.  Serial labs.  History of multiple myeloma: Resume any home medications once verified.  Serial labs.     Discussed plan with RN.    DVT prophylaxis:  Mechanical DVT prophylaxis orders are present.        CODE STATUS:   Code Status (Patient has no pulse and is not breathing): CPR (Attempt to Resuscitate)  Medical Interventions (Patient has pulse or is breathing): Full Support        Electronically signed by Rebeca Cloud MD, 03/15/24, 8:43 AM EDT.

## 2024-03-15 NOTE — H&P
[facility] HISTORY AND PHYSICAL  Date: 3/14/2024   Patient Name: Francisco Griffin  : 1975  MRN: [id number]  Primary Care Physician:  Provider, No Known  Date of admission: 3/14/2024    Subjective   Subjective     Chief Complaint: Fatigue    HPI:    Francisco Griffin is a 48 y.o. male past medical history of end-stage renal disease, multiple myeloma that presented to the emergency department for evaluation of fatigue.  On review of systems though he did have multiple other complaints.  States over the past few days he had nausea and vomiting along with cough and blood mixed in with his sputum.  States that this had not occurred today however.  Also endorse melena over the past 2 days but no melena today.  Denied any fevers, chills, sweats, chest pain, shortness of breath, palpitations, abdominal pain, rash.  In the emergency department found to be anemic with hemoglobin 6.7 which is down from 7.5 most recently.  Also noted to be influenza positive and an elevated lipase of 477.  Patient receiving 1 unit packed red blood cell in the emergency department and will be admitted for ongoing monitoring management.      Personal History     Past Medical History:  Past Medical History:   Diagnosis Date    Renal disorder    Multiple myeloma      Past Surgical History:  History reviewed. No pertinent surgical history.  Fistula placement    Family History:   History reviewed. No pertinent family history.      Social History:   Social History     Tobacco Use    Smoking status: Never     Passive exposure: Never    Smokeless tobacco: Never   Vaping Use    Vaping status: Never Used   Substance Use Topics    Alcohol use: Never         Home Medications:  NIFEdipine CC, calcium carbonate EX, labetalol, losartan, and vitamin D    Allergies:  No Known Allergies    Review of Systems   All systems were reviewed and negative except for: Fatigue, melena, hemoptysis, malaise    Objective   Objective     Vitals:   Temp:  [97.9  °F (36.6 °C)-99.1 °F (37.3 °C)] 97.9 °F (36.6 °C)  Heart Rate:  [85-89] 85  Resp:  [17-20] 18  BP: (138-150)/(74-94) 150/94    Physical Exam    Constitutional: Awake, alert, no acute distress   Eyes: Pupils equal, sclerae anicteric, no conjunctival injection   HENT: NCAT, mucous membranes moist   Neck: Supple, no thyromegaly, no lymphadenopathy, trachea midline   Respiratory: Clear to auscultation bilaterally, nonlabored respirations    Cardiovascular: RRR, no murmurs, rubs, or gallops, palpable pedal pulses bilaterally   Gastrointestinal: Positive bowel sounds, soft, nontender, nondistended   Musculoskeletal: No bilateral ankle edema, no clubbing or cyanosis to extremities   Psychiatric: Appropriate affect, cooperative   Neurologic: Oriented x 3, strength symmetric in all extremities, Cranial Nerves grossly intact to confrontation, speech clear   Skin: No rashes     Result Review    Result Review:  I have personally reviewed the results from the time of this admission to 3/14/2024 23:11 EDT and agree with these findings:  [x]  Laboratory  []  Microbiology  [x]  Radiology  []  EKG/Telemetry   []  Cardiology/Vascular   []  Pathology  []  Old records  []  Other:      Assessment & Plan   Assessment / Plan     Assessment/Plan:   Symptomatic anemia: Receiving 1 unit packed red blood cell in the emergency department.  Will trend H&H.  Fecal occult was negative but will empirically keep him on clear liquid diet for now pending serial H&H's.  Start Protonix as well.  He did complain of melena over the past few days but stated no melena seen today.  Monitor on telemetry.  End-stage renal disease with volume overload: Consult nephrology and appreciate recommendations.  Supplemental oxygen if needed.  Currently tolerating room air.  Monitor intake and output.  Serial labs  Hemoptysis?:  Endorsed small amounts of blood mixed in with sputum with his cough.  Will continue to monitor this for now.  Chest x-ray is clear and patient  states he had no further episodes today.  Elevated lipase: Abdomen soft and nontender.  Supportive care.  Low threshold to order CT abdomen pelvis if any new signs or symptoms develop.  Influenza: Supportive care.  Supplemental oxygen if needed.  Currently tolerating room air.  Thrombocytopenia: Transfuse for platelet count less than 10,000 and less active signs of bleeding and will transfuse for platelet count less than 50,000.  Serial labs.  History of multiple myeloma: Resume any home medications once verified.  Serial labs.      DVT prophylaxis:  Mechanical DVT prophylaxis orders are signed and held.          CODE STATUS:    Code Status (Patient has no pulse and is not breathing): CPR (Attempt to Resuscitate)  Medical Interventions (Patient has pulse or is breathing): Full Support      Admission Status:  I believe this patient meets inpatient status.    Electronically signed by Elmer Moraes Jr, MD, 03/14/24, 11:11 PM EDT.

## 2024-03-15 NOTE — NURSING NOTE
1815 - Call placed to primary RN regarding blood pressure, BP remains high and headache is still there.   1848 - Call placed to primary RN, Roslaba regarding blood pressure, she is awaiting MD return call.   1925 - JEVON Leahy called to request manual blood pressure reading  1934 - Manual BP reading of 196/106 called to JEVON Leahy.

## 2024-03-15 NOTE — PLAN OF CARE
Goal Outcome Evaluation:  Plan of Care Reviewed With: patient           Outcome Evaluation: Patient is alert and oriented, on room air. Patient is completing dialysis today. EGD scheduled for Monday. Continuing plan of care.

## 2024-03-15 NOTE — PLAN OF CARE
Goal Outcome Evaluation:  Pt complained of a headache, which was managed with oral tylenol. Pt's BP runs high, please see vital sign entries. Pt is able to ambulate ad clement. Pt is awaiting treatment planning. Clay Melendez RN

## 2024-03-15 NOTE — CONSULTS
[facility]  Initial Inpatient Consult Note    Referring Provider: Aleisha    Reason for Consultation: Anemia    Subjective     History of present illness:    48 y.o. male with a past medical history of ESRD on hemodialysis-Monday Wednesday and Friday, hypertension, and history of multiple myeloma resulting in ESRD who presented to the hospital 3/14/2024 due to worsening fatigue.  His hemoglobin when he arrived in the emergency room was 6.5, today it is 7.3.  He says that his symptoms began on Tuesday, which included nausea and vomiting along with cough-scant amount of blood mixed with sputum.  He also states he has had liquid bowel movements that are black in color for the last 4-5 days.  Patient says he has had no nausea or vomiting or black stool since yesterday.  Patient denies abdominal pain, fevers, hematochezia, NSAID/thinner use, and new medications.    03/15/2024  Hgb-7.3  HCT-21.9  Platelets-71  Fecal occult-negative  Lipase-477  Sodium-132  Calcium-5.7  BUN-123  Creatinine-12.91  eGFR-4.4    Unable to locate and previous EGD/Colonoscopy reports    Past Medical History:  Past Medical History:   Diagnosis Date    Renal disorder      Past Surgical History:  History reviewed. No pertinent surgical history.   Social History:   Social History     Tobacco Use    Smoking status: Never     Passive exposure: Never    Smokeless tobacco: Never   Substance Use Topics    Alcohol use: Never      Family History:  History reviewed. No pertinent family history.    Home Meds:  Medications Prior to Admission   Medication Sig Dispense Refill Last Dose    calcium carbonate EX (TUMS EX) 750 MG chewable tablet Chew 1 tablet.       labetalol (NORMODYNE) 300 MG tablet Take 1 tablet by mouth Every 12 (Twelve) Hours.       losartan (COZAAR) 25 MG tablet Take 1 tablet by mouth Daily.       NIFEdipine CC (ADALAT CC) 30 MG 24 hr tablet TAKE 2 TABELTS BY MOUTH TWICE DAILY       vitamin D (ERGOCALCIFEROL) 1.25 MG  (28972 UT) capsule capsule Take 1 capsule by mouth 1 (One) Time Per Week.        Current Meds:   amLODIPine, 10 mg, Oral, Q24H  labetalol, 300 mg, Oral, Q12H  lisinopril, 40 mg, Oral, Q24H  pantoprazole, 40 mg, Intravenous, Q12H  sodium chloride, 10 mL, Intravenous, Q12H      Allergies:  No Known Allergies  Review of Systems  Pertinent items are noted in HPI     Objective     Vital Signs  Temp:  [97.9 °F (36.6 °C)-99.1 °F (37.3 °C)] 98.1 °F (36.7 °C)  Heart Rate:  [83-89] 83  Resp:  [17-20] 17  BP: (138-184)/() 182/104  Physical Exam:  General Appearance:    Alert, cooperative, in no acute distress   Head:    Normocephalic, without obvious abnormality, atraumatic   Eyes:          conjunctivae and sclerae normal, no icterus   Throat:   no thrush, oral mucosa moist   Neck:   Supple, no adenopathy   Lungs:     Unlabored breathing    Heart:    Regular rhythm and normal rate    Chest Wall:    No abnormalities observed   Abdomen:     Soft, non distended, non tender   Extremities:   no edema, no redness   Skin:   No bruising or rash   Psychiatric:  normal mood and insight     Results Review:   I reviewed the patient's new clinical results.    Results from last 7 days   Lab Units 03/15/24  0512 03/14/24 2025   WBC 10*3/mm3 3.76 4.24   HEMOGLOBIN g/dL 7.3* 6.5*   HEMATOCRIT % 21.9* 18.5*   PLATELETS 10*3/mm3 71* 68*     Results from last 7 days   Lab Units 03/15/24  0512 03/14/24 2025   SODIUM mmol/L 132* 131*   POTASSIUM mmol/L 4.9 4.7   CHLORIDE mmol/L 94* 92*   CO2 mmol/L 15.0* 16.0*   BUN mg/dL 123* 124*   CREATININE mg/dL 12.91* 12.76*   CALCIUM mg/dL 5.7* 6.1*   BILIRUBIN mg/dL 0.3 0.3   ALK PHOS U/L 61 79   ALT (SGPT) U/L 33 41   AST (SGOT) U/L 38 47*   GLUCOSE mg/dL 98 111*         Lab Results   Lab Value Date/Time    LIPASE 477 (H) 03/14/2024 2025       Radiology:  XR Chest 2 View    Result Date: 3/14/2024    1. Findings most suggestive of moderate congestive heart failure.  Superimposed pneumonia,  aspiration or pulmonary hemorrhage is not excluded.     Dandy Elizondo M.D.       Electronically Signed and Approved By: Dandy Elizondo M.D. on 3/14/2024 at 21:28               Assessment & Plan     Symptomatic anemia    ESRD (end stage renal disease)    Primary hypertension    Influenza A       Assessment:  Anemia  End-stage renal disease  Hematemesis  Thrombocytopenia    Plan:  Patient arrived in emergency room hemoglobin was at 6.5-received 1 unit PRBC's in emergency department bringing his hemoglobin up to 7.3  Discussed with patient about proceeding with EGD this afternoon to evaluate anemia-patient is agreeable-he did have Jell-O and an Italian ice cup around 845 so procedure will be performed 6 hours from then per anesthesia  Continue to monitor H&H  Fecal occult was negative  Continue to monitor platelets and if less than 50,000 transfuse  Patient understands and agrees to the plan      I discussed the patients findings and my recommendations with patient and nursing staff.    Electronically signed by BELTRAN Diego, 03/15/24, 11:03 AM EDT.    Attending Attestation  I reviewed the below documentation and evaluation and discussed the care plan with BELTRAN Diego, I agree with her findings and plan as documented.    Patient had eaten this morning but also needing hemodialysis this afternoon and therefore EGD was canceled.  He is hemodynamically stable without evidence of ongoing GI bleeding.  We will therefore continue PPI and monitor his hemoglobin.  Plan for EGD on Monday    Electronically signed by Parth Wilcox MD, 03/15/24, 4:40 PM EDT.    Portions of this documentation were transcribed electronically from a voice recognition software.  I confirm all data accurately represents the service(s) I performed at today's visit.

## 2024-03-15 NOTE — NURSING NOTE
1700 - Patient complains of headache. Blood pressure 165/96 at 1630.   Pressure taken again 174/177, and 175/111. Discussed with patient that I would discuss these with his primary RN. Tylenol offered and refused by patient.   1720 - Call placed to Rosalba regarding patients complaint of headache and increasing blood pressure readings.

## 2024-03-15 NOTE — NURSING NOTE
1807 - Patient continues to complain of headache and /104 after receiving hydralazine at 1737. Call placed to JEVON Navarrete.

## 2024-03-16 LAB
BH BB BLOOD EXPIRATION DATE: NORMAL
BH BB BLOOD TYPE BARCODE: 5100
BH BB DISPENSE STATUS: NORMAL
BH BB PRODUCT CODE: NORMAL
BH BB UNIT NUMBER: NORMAL
CROSSMATCH INTERPRETATION: NORMAL
UNIT  ABO: NORMAL
UNIT  RH: NORMAL

## 2024-03-16 NOTE — NURSING NOTE
Patient notified this nurse that he wanted to leave. Patient educated that leaving would be against medical advice. Educated on current elevated BP, current Hgb status, and planned EGD. Patient states he has BP meds at home and could scheduled his own egd. PA notified and spoke with resident. Resident still insisting to leave against medical advice. AMA paper work signed, chest port de-accessed, and tele box removed.

## 2024-03-16 NOTE — NURSING NOTE
2001 - JEVON Leahy here to give patient medication for blood pressure.   2020 -   Duration of Treatment 4.0 Hours - 4 hours complete   Access Site Tunneled Dialysis Catheter - Left AVG   Dialyzer Revaclear    mL/min   Dialysate Temperature (C) 36   BFR-As tolerated to a maximum of: 400 mL/min   Dialysate Solution Bath: K+ = 2 mEq, Ca = 3.5mEq   Bicarb 30 mEq   Na+ 138 mEq   Fluid Removal: 2 - 2  liters fluid removed   Tolerated removal of 2 liters of fluid. Patient had blood pressures ranging 170-200 systolic / 100's diastolic. Medicated for BP x during during course of treatment. Access functioned well. Pressure held for approximately 14 minutes. Gauze and paper tape dressing applied, remains clean, dry, and intact, Report to JEVON Leahy. Awaiting transport.

## 2024-03-18 LAB
CYTO UR: NORMAL
LAB AP CASE REPORT: NORMAL
LAB AP CLINICAL INFORMATION: NORMAL
PATH REPORT.FINAL DX SPEC: NORMAL
PATH REPORT.GROSS SPEC: NORMAL

## 2024-03-25 ENCOUNTER — TELEPHONE (OUTPATIENT)
Dept: GASTROENTEROLOGY | Facility: CLINIC | Age: 49
End: 2024-03-25
Payer: COMMERCIAL

## 2024-03-25 NOTE — TELEPHONE ENCOUNTER
Pt was scheduled for EGD on 3.18.24.  pt left AMA.    See note dos: 3.15.24 from nurse Leahy.  Pt AMA.     Will mail certified letter to anna stahl

## 2024-03-29 ENCOUNTER — TRANSCRIBE ORDERS (OUTPATIENT)
Dept: ADMINISTRATIVE | Facility: HOSPITAL | Age: 49
End: 2024-03-29
Payer: COMMERCIAL

## 2024-03-29 DIAGNOSIS — N18.6 ESRD (END STAGE RENAL DISEASE): ICD-10-CM

## 2024-03-29 DIAGNOSIS — D64.9 ANEMIA, UNSPECIFIED TYPE: Primary | ICD-10-CM

## 2024-03-30 ENCOUNTER — HOSPITAL ENCOUNTER (OUTPATIENT)
Dept: INFUSION THERAPY | Facility: HOSPITAL | Age: 49
Discharge: HOME | End: 2024-03-30
Admitting: NURSE PRACTITIONER
Payer: COMMERCIAL

## 2024-03-30 VITALS
RESPIRATION RATE: 18 BRPM | DIASTOLIC BLOOD PRESSURE: 122 MMHG | OXYGEN SATURATION: 100 % | TEMPERATURE: 98.4 F | HEART RATE: 100 BPM | SYSTOLIC BLOOD PRESSURE: 190 MMHG

## 2024-03-30 LAB
ABO GROUP BLD: NORMAL
BLD GP AB SCN SERPL QL: NEGATIVE
HCT VFR BLD AUTO: 13 % (ref 37.5–51)
HGB BLD-MCNC: 4.2 G/DL (ref 13–17.7)
RH BLD: POSITIVE
T&S EXPIRATION DATE: NORMAL

## 2024-03-30 PROCEDURE — 86900 BLOOD TYPING SEROLOGIC ABO: CPT | Performed by: NURSE PRACTITIONER

## 2024-03-30 PROCEDURE — 85018 HEMOGLOBIN: CPT | Performed by: NURSE PRACTITIONER

## 2024-03-30 PROCEDURE — 86901 BLOOD TYPING SEROLOGIC RH(D): CPT | Performed by: NURSE PRACTITIONER

## 2024-03-30 PROCEDURE — 25010000002 HEPARIN LOCK FLUSH PER 10 UNITS: Performed by: NURSE PRACTITIONER

## 2024-03-30 PROCEDURE — 86850 RBC ANTIBODY SCREEN: CPT | Performed by: NURSE PRACTITIONER

## 2024-03-30 PROCEDURE — 85014 HEMATOCRIT: CPT | Performed by: NURSE PRACTITIONER

## 2024-03-30 RX ORDER — HEPARIN SODIUM (PORCINE) LOCK FLUSH IV SOLN 100 UNIT/ML 100 UNIT/ML
5 SOLUTION INTRAVENOUS AS NEEDED
Status: DISCONTINUED | OUTPATIENT
Start: 2024-03-30 | End: 2024-03-31 | Stop reason: HOSPADM

## 2024-03-30 RX ADMIN — HEPARIN 500 UNITS: 100 SYRINGE at 18:16

## 2024-03-30 NOTE — NURSING NOTE
Seen today for a blood transfusion. Received 2 units. Tolerated well.  Blood pressures running high, patient stated he did not take his blood pressure medication this morning and stated he will take his medication once he gets home.

## 2024-04-01 LAB
BH BB BLOOD EXPIRATION DATE: NORMAL
BH BB BLOOD EXPIRATION DATE: NORMAL
BH BB BLOOD TYPE BARCODE: 5100
BH BB BLOOD TYPE BARCODE: 5100
BH BB DISPENSE STATUS: NORMAL
BH BB DISPENSE STATUS: NORMAL
BH BB PRODUCT CODE: NORMAL
BH BB PRODUCT CODE: NORMAL
BH BB UNIT NUMBER: NORMAL
BH BB UNIT NUMBER: NORMAL
CROSSMATCH INTERPRETATION: NORMAL
CROSSMATCH INTERPRETATION: NORMAL
UNIT  ABO: NORMAL
UNIT  ABO: NORMAL
UNIT  RH: NORMAL
UNIT  RH: NORMAL

## 2024-04-23 ENCOUNTER — OFFICE VISIT (OUTPATIENT)
Dept: GASTROENTEROLOGY | Facility: CLINIC | Age: 49
End: 2024-04-23
Payer: COMMERCIAL

## 2024-04-23 VITALS
OXYGEN SATURATION: 100 % | WEIGHT: 143.8 LBS | SYSTOLIC BLOOD PRESSURE: 165 MMHG | HEART RATE: 102 BPM | BODY MASS INDEX: 20.59 KG/M2 | HEIGHT: 70 IN | DIASTOLIC BLOOD PRESSURE: 94 MMHG

## 2024-04-23 DIAGNOSIS — K92.1 BLACK STOOL: ICD-10-CM

## 2024-04-23 DIAGNOSIS — D64.9 ANEMIA, UNSPECIFIED TYPE: Primary | ICD-10-CM

## 2024-04-23 RX ORDER — PREDNISOLONE ACETATE 10 MG/ML
SUSPENSION/ DROPS OPHTHALMIC
COMMUNITY
Start: 2024-03-18

## 2024-04-23 RX ORDER — ERYTHROMYCIN 5 MG/G
OINTMENT OPHTHALMIC
COMMUNITY
Start: 2024-03-18

## 2024-04-23 RX ORDER — CARVEDILOL 12.5 MG/1
1 TABLET ORAL EVERY 12 HOURS SCHEDULED
COMMUNITY
Start: 2024-04-06

## 2024-04-23 RX ORDER — PANTOPRAZOLE SODIUM 40 MG/1
1 TABLET, DELAYED RELEASE ORAL DAILY
COMMUNITY
Start: 2024-04-12

## 2024-04-23 NOTE — PROGRESS NOTES
Chief Complaint  Anemia (Black stools- about a month ago/)    Francisco Griffin is a 48 y.o. male who presents to [facility] on referral from BELTRAN Shoemaker for a gastroenterology evaluation of anemia.      History of Present Illness  New patient presents to the office for anemia. Patient has history ESRD on hemodialysis MW, established with Dr. Lerner. Patient hospitalized 3/14 for anemia with plans for inpatient EGD but left AMA. Occult blood 3/14/2024 negative. Nephrology started him on Protonix 40mg daily. Most recent CBC 3/15/24 shows a hemoglobin of 7.1. Denies heartburn, nausea, vomiting, epigastric pain, and dysphagia. Last episode of black stool was 3 weeks ago. Denies lower GI symptoms - change in bowel habits, constipation, diarrhea, melena, and hematochezia.     Past Medical History:   Diagnosis Date    Anemia     Renal disorder        Past Surgical History:   Procedure Laterality Date    PORTACATH PLACEMENT           Current Outpatient Medications:     calcium carbonate EX (TUMS EX) 750 MG chewable tablet, Chew 1 tablet., Disp: , Rfl:     carvedilol (COREG) 12.5 MG tablet, Take 1 tablet by mouth Every 12 (Twelve) Hours., Disp: , Rfl:     erythromycin (ROMYCIN) 5 MG/GM ophthalmic ointment, PLACE A SMALL AMOUNT INTO BOTH EYES EVERY 4 HOURS FOR 14 DAYS, Disp: , Rfl:     labetalol (NORMODYNE) 300 MG tablet, Take 1 tablet by mouth Every 12 (Twelve) Hours., Disp: , Rfl:     losartan (COZAAR) 25 MG tablet, Take 1 tablet by mouth Daily., Disp: , Rfl:     NIFEdipine CC (ADALAT CC) 30 MG 24 hr tablet, TAKE 2 TABELTS BY MOUTH TWICE DAILY, Disp: , Rfl:     pantoprazole (PROTONIX) 40 MG EC tablet, Take 1 tablet by mouth Daily., Disp: , Rfl:     prednisoLONE acetate (PRED FORTE) 1 % ophthalmic suspension, , Disp: , Rfl:     vitamin D (ERGOCALCIFEROL) 1.25 MG (60824 UT) capsule capsule, Take 1 capsule by mouth 1 (One) Time Per Week. (Patient not taking: Reported on  "4/23/2024), Disp: , Rfl:      No Known Allergies    Family History   Adopted: Yes   Problem Relation Age of Onset    Colon cancer Neg Hx         Social History     Social History Narrative    Not on file       Immunization:    There is no immunization history on file for this patient.     Objective     Vital Signs:   /94 (BP Location: Right arm, Patient Position: Sitting, Cuff Size: Adult)   Pulse 102   Ht 177.8 cm (70\")   Wt 65.2 kg (143 lb 12.8 oz)   SpO2 100%   BMI 20.63 kg/m²       Physical Exam  Constitutional:       Appearance: Normal appearance. He is normal weight.   HENT:      Head: Normocephalic and atraumatic.      Nose: Nose normal.   Pulmonary:      Effort: Pulmonary effort is normal.   Skin:     General: Skin is warm and dry.   Neurological:      Mental Status: He is alert and oriented to person, place, and time. Mental status is at baseline.   Psychiatric:         Mood and Affect: Mood normal.         Behavior: Behavior normal.         Thought Content: Thought content normal.         Judgment: Judgment normal.         Result Review :     CBC w/diff          3/14/2024    20:25 3/15/2024    05:12 3/15/2024    10:19 3/30/2024    10:17   CBC w/Diff   WBC 4.24  3.76  3.31     RBC 1.97  2.38  2.25     Hemoglobin 6.5  7.3  7.1  4.2    Hematocrit 18.5  21.9  20.5  13.0    MCV 93.9  92.0  91.1     MCH 33.0  30.7  31.6     MCHC 35.1  33.3  34.6     RDW 14.4  14.8  15.3     Platelets 68  71  61     Neutrophil Rel % 65.6   71.9     Immature Granulocyte Rel % 0.5       Lymphocyte Rel % 20.0   13.3     Monocyte Rel % 6.6   7.9     Eosinophil Rel % 7.1   6.3     Basophil Rel % 0.2   0.3       CMP          2/29/2024    12:22 3/14/2024    20:25 3/15/2024    05:12   CMP   Glucose 120  111  98    BUN 84  124  123    Creatinine 12.62  12.76  12.91    EGFR 4.5  4.4  4.4    Sodium 136  131  132    Potassium 4.7  4.7  4.9    Chloride 95  92  94    Calcium 5.5  6.1  5.7    Total Protein 5.8  5.5  5.2    Albumin " "3.2  2.9  2.8    Globulin 2.6  2.6  2.4    Total Bilirubin 0.3  0.3  0.3    Alkaline Phosphatase 136  79  61    AST (SGOT) 17  47  38    ALT (SGPT) 19  41  33    Albumin/Globulin Ratio 1.2  1.1  1.2    BUN/Creatinine Ratio 6.7  9.7  9.5    Anion Gap 18.4  23.0  23.0        Iron Profile No results found for: \"IRON\", \"TIBC\", \"LABIRON\", \"TRANSFERRIN\"  Ferritin No results found for: \"FERRITIN\"        Assessment and Plan    Diagnoses and all orders for this visit:    1. Anemia, unspecified type (Primary)  -     Case Request; Standing  -     Verify NPO; Standing  -     Obtain Informed Consent; Standing  -     Case Request    2. Black stool  -     Case Request; Standing  -     Verify NPO; Standing  -     Obtain Informed Consent; Standing  -     Case Request    48-year-old new patient presents to the office for anemia. Patient has history ESRD on hemodialysis MW, established with Dr. Lerner. Patient hospitalized 3/14 for anemia with plans for inpatient EGD but left AMA. Occult blood 3/14/2024 negative. Nephrology started him on Protonix 40mg daily. Most recent CBC 3/15/24 shows a hemoglobin of 7.1. Last episode of black stool was 3 weeks ago.  Patient has no GI complaints.  Advised patient to undergo EGD and colonoscopy for further evaluation of anemia and never having any scopes in the past.  Patient ultimately deferred colonoscopy, patient aware risk associated with deferring procedure.  Patient agreeable to proceeding with EGD. Denies cardiopulmonary history.  Patient is agreeable to plan call office any questions or concerns.    EGD Surgical Risk and Benefits: Possible risk/complications, benefits, and alternatives to surgical or invasive procedure have been explained to patient and/or legal guardian. Risks include bleeding, infection, and perforation. Patient has been evaluated and can tolerate anesthesia and/or sedation. Risk, benefits, and alternatives to anesthesia and sedation have been explained to patient and/or " legal guardian.     Follow Up   No follow-ups on file.  Patient was given instructions and counseling regarding his condition or for health maintenance advice. Please see specific information pulled into the AVS if appropriate.

## 2024-04-24 ENCOUNTER — PATIENT ROUNDING (BHMG ONLY) (OUTPATIENT)
Dept: GASTROENTEROLOGY | Facility: CLINIC | Age: 49
End: 2024-04-24
Payer: COMMERCIAL

## 2024-04-24 ENCOUNTER — TELEPHONE (OUTPATIENT)
Dept: GASTROENTEROLOGY | Facility: CLINIC | Age: 49
End: 2024-04-24
Payer: COMMERCIAL

## 2024-04-24 NOTE — TELEPHONE ENCOUNTER
Francisco Griffin  1975    Patient requested to Reschedule their EGD. I have offered to reschedule this patient and patient has AGREED.     Patient has been rescheduled to 05.09.24.    Reason for cancelling/rescheduling: NONE GIVEN    This procedure was ordered by BELTRNA Pinzon for an important reason. We want to inform you that there are risks associated with not proceeding with the procedure at this time such as a delay in diagnosis, risk of incurable disease, or cancer.    Patient plans to call us back to reschedule: N/A    Updated clearance needed?: NO    Is the patient currently on any injectable medications for weight loss or diabetes? NO    Patient verbalized understanding for all of the above information.

## 2024-04-24 NOTE — PROGRESS NOTES
"4/24/2024      Hello, may I speak with Francisco Griffin     My name is Meredith. I am calling from [facility] [place]. I show that you had a recent visit with BELTRAN Pinzon.    Before we get started may I verify your date of birth? 1975    I am calling to officially welcome you to our practice and ask about your recent visit. Is this a good time to talk? Yes     Tell me about your visit with us. What things went well? \"It all went well, everyone was really nice\"    We strive to ensure that we protect your safety and privacy. Is there anything we could have done to improve this during your visit?    No     We're always looking for ways to make our patients' experiences even better. Do you have recommendations on ways we may improve?  No     Overall were you satisfied with your first visit to our practice?  Yes     I appreciate you taking the time to speak with me today. Is there anything else I can do for you?  No     I am glad to hear that you had a very good visit and I appreciate you taking the time to provide feedback on this call. We would greatly appreciate you filling out a survey if you receive one in the mail, email or text. This is a great opportunity to provide any additional feedback that you may think of after this call as well.       Thank you, and have a great day.   "

## 2024-05-06 ENCOUNTER — TELEPHONE (OUTPATIENT)
Dept: GASTROENTEROLOGY | Facility: CLINIC | Age: 49
End: 2024-05-06
Payer: COMMERCIAL

## 2024-06-18 ENCOUNTER — TRANSCRIBE ORDERS (OUTPATIENT)
Dept: ADMINISTRATIVE | Facility: HOSPITAL | Age: 49
End: 2024-06-18
Payer: COMMERCIAL

## 2024-06-18 DIAGNOSIS — N28.1 ACQUIRED COMPLEX RENAL CYST: Primary | ICD-10-CM

## 2024-06-26 ENCOUNTER — TRANSCRIBE ORDERS (OUTPATIENT)
Dept: ADMINISTRATIVE | Facility: HOSPITAL | Age: 49
End: 2024-06-26
Payer: COMMERCIAL

## 2024-06-26 DIAGNOSIS — N18.6 ESRD (END STAGE RENAL DISEASE): ICD-10-CM

## 2024-06-26 DIAGNOSIS — D64.9 ANEMIA, UNSPECIFIED TYPE: Primary | ICD-10-CM

## 2024-06-27 ENCOUNTER — HOSPITAL ENCOUNTER (OUTPATIENT)
Dept: INFUSION THERAPY | Facility: HOSPITAL | Age: 49
Discharge: HOME | End: 2024-06-27
Admitting: NURSE PRACTITIONER
Payer: COMMERCIAL

## 2024-06-27 VITALS
DIASTOLIC BLOOD PRESSURE: 96 MMHG | TEMPERATURE: 98.2 F | HEART RATE: 91 BPM | OXYGEN SATURATION: 100 % | SYSTOLIC BLOOD PRESSURE: 160 MMHG | RESPIRATION RATE: 17 BRPM

## 2024-06-27 LAB
ABO GROUP BLD: NORMAL
BLD GP AB SCN SERPL QL: NEGATIVE
HCT VFR BLD AUTO: 22.3 % (ref 37.5–51)
HGB BLD-MCNC: 7.5 G/DL (ref 13–17.7)
RH BLD: POSITIVE
T&S EXPIRATION DATE: NORMAL

## 2024-06-27 PROCEDURE — 85014 HEMATOCRIT: CPT | Performed by: NURSE PRACTITIONER

## 2024-06-27 PROCEDURE — 86900 BLOOD TYPING SEROLOGIC ABO: CPT | Performed by: NURSE PRACTITIONER

## 2024-06-27 PROCEDURE — 86901 BLOOD TYPING SEROLOGIC RH(D): CPT | Performed by: NURSE PRACTITIONER

## 2024-06-27 PROCEDURE — 25010000002 HEPARIN LOCK FLUSH PER 10 UNITS: Performed by: NURSE PRACTITIONER

## 2024-06-27 PROCEDURE — 86850 RBC ANTIBODY SCREEN: CPT | Performed by: NURSE PRACTITIONER

## 2024-06-27 PROCEDURE — 85018 HEMOGLOBIN: CPT | Performed by: NURSE PRACTITIONER

## 2024-06-27 RX ORDER — HEPARIN SODIUM (PORCINE) LOCK FLUSH IV SOLN 100 UNIT/ML 100 UNIT/ML
5 SOLUTION INTRAVENOUS AS NEEDED
Status: DISCONTINUED | OUTPATIENT
Start: 2024-06-27 | End: 2024-06-29 | Stop reason: HOSPADM

## 2024-06-27 RX ADMIN — HEPARIN 500 UNITS: 100 SYRINGE at 17:28

## 2024-06-27 NOTE — NURSING NOTE
Patient VSS. Patient received 2 units of blood. Patient tolerated well. Patient discharged outpatient home/self-care.

## 2024-07-09 ENCOUNTER — OFFICE VISIT (OUTPATIENT)
Dept: SURGERY | Facility: CLINIC | Age: 49
End: 2024-07-09
Payer: COMMERCIAL

## 2024-07-09 VITALS — WEIGHT: 142 LBS | RESPIRATION RATE: 18 BRPM | HEIGHT: 70 IN | BODY MASS INDEX: 20.33 KG/M2

## 2024-07-09 DIAGNOSIS — N18.6 ESRD (END STAGE RENAL DISEASE): Primary | ICD-10-CM

## 2024-07-09 PROCEDURE — 99203 OFFICE O/P NEW LOW 30 MIN: CPT | Performed by: SURGERY

## 2024-07-09 RX ORDER — SODIUM CHLORIDE 0.9 % (FLUSH) 0.9 %
10 SYRINGE (ML) INJECTION EVERY 12 HOURS SCHEDULED
OUTPATIENT
Start: 2024-07-09

## 2024-07-09 RX ORDER — ONDANSETRON 2 MG/ML
4 INJECTION INTRAMUSCULAR; INTRAVENOUS EVERY 6 HOURS PRN
OUTPATIENT
Start: 2024-07-09

## 2024-07-09 RX ORDER — SODIUM CHLORIDE 9 MG/ML
40 INJECTION, SOLUTION INTRAVENOUS AS NEEDED
OUTPATIENT
Start: 2024-07-09

## 2024-07-09 RX ORDER — SODIUM CHLORIDE 0.9 % (FLUSH) 0.9 %
10 SYRINGE (ML) INJECTION AS NEEDED
OUTPATIENT
Start: 2024-07-09

## 2024-07-09 RX ORDER — SODIUM CHLORIDE, SODIUM LACTATE, POTASSIUM CHLORIDE, CALCIUM CHLORIDE 600; 310; 30; 20 MG/100ML; MG/100ML; MG/100ML; MG/100ML
70 INJECTION, SOLUTION INTRAVENOUS CONTINUOUS
OUTPATIENT
Start: 2024-07-09

## 2024-07-09 NOTE — H&P (VIEW-ONLY)
Inpatient History and Physical Surgical Orders    Preadmission Location:   Preadmission Time:  Facility:  Surgery Date:  Surgery Time:  Preadmission Test date:     Chief Complaint  Outpatient History and Physical / Surgical Orders    Primary Care Provider: Provider, No Known    Referring Provider: Luke Lerner MD    Subjective      Patient Name: Francisco Griffin : 1975    HPI  The patient is a 48-year-old gentleman who presents for peritoneal dialysis catheter placement.  He is currently doing hemodialysis but wishes to try peritoneal dialysis at home.    Past History:  Medical History: has a past medical history of Anemia and Renal disorder.   Surgical History: has a past surgical history that includes Portacath placement.   Family History: family history is not on file. He was adopted.   Social History: reports that he has never smoked. He has never been exposed to tobacco smoke. He has never used smokeless tobacco. He reports that he does not drink alcohol and does not use drugs.  Allergies: Patient has no known allergies.       Current Outpatient Medications:     calcium carbonate EX (TUMS EX) 750 MG chewable tablet, Chew 1 tablet., Disp: , Rfl:     carvedilol (COREG) 12.5 MG tablet, Take 1 tablet by mouth Every 12 (Twelve) Hours., Disp: , Rfl:     erythromycin (ROMYCIN) 5 MG/GM ophthalmic ointment, PLACE A SMALL AMOUNT INTO BOTH EYES EVERY 4 HOURS FOR 14 DAYS, Disp: , Rfl:     labetalol (NORMODYNE) 300 MG tablet, Take 1 tablet by mouth Every 12 (Twelve) Hours., Disp: , Rfl:     losartan (COZAAR) 25 MG tablet, Take 1 tablet by mouth Daily., Disp: , Rfl:     NIFEdipine CC (ADALAT CC) 30 MG 24 hr tablet, TAKE 2 TABELTS BY MOUTH TWICE DAILY, Disp: , Rfl:     pantoprazole (PROTONIX) 40 MG EC tablet, Take 1 tablet by mouth Daily., Disp: , Rfl:     prednisoLONE acetate (PRED FORTE) 1 % ophthalmic suspension, , Disp: , Rfl:     vitamin D (ERGOCALCIFEROL) 1.25 MG (28134 UT) capsule capsule, Take 1 capsule by  "mouth 1 (One) Time Per Week., Disp: , Rfl:        Objective   Vital Signs:   Resp 18   Ht 177.8 cm (70\")   Wt 64.4 kg (142 lb)   BMI 20.37 kg/m²       Physical Exam  Vitals and nursing note reviewed.   Constitutional:       Appearance: Normal appearance. The patient is well-developed.   Cardiovascular:      Rate and Rhythm: Normal rate and regular rhythm.   Pulmonary:      Effort: Pulmonary effort is normal.      Breath sounds: Normal air entry.   Abdominal:      General: Bowel sounds are normal.      Palpations: Abdomen is soft.      Skin:     General: Skin is warm and dry.   Neurological:      Mental Status: The patient is alert and oriented to person, place, and time.      Motor: Motor function is intact.   Psychiatric:         Mood and Affect: Mood normal.       Result Review :               Assessment and Plan   Diagnoses and all orders for this visit:    1. ESRD (end stage renal disease) (Primary)  -     Case Request; Standing  -     Follow Anesthesia Guidelines / Protocol; Standing  -     Verify NPO Status; Standing  -     Obtain Informed Consent; Standing  -     Verify / Perform Chlorhexidine Skin Prep; Standing  -     Insert Peripheral IV; Standing  -     Saline Lock & Maintain IV Access; Standing  -     sodium chloride 0.9 % flush 10 mL  -     sodium chloride 0.9 % flush 10 mL  -     sodium chloride 0.9 % infusion 40 mL  -     lactated ringers infusion  -     Place Sequential Compression Device; Standing  -     Maintain Sequential Compression Device; Standing  -     ondansetron (ZOFRAN) injection 4 mg  -     ceFAZolin (ANCEF) 2,000 mg in sodium chloride 0.9 % 100 mL IVPB  -     Case Request    We will schedule him for a laparoscopic peritoneal dialysis catheter placement.  I have described that procedure to him as well as the risk and benefits and he is agreeable to proceeding.    I  Codey Ma MD  07/09/2024    "

## 2024-07-09 NOTE — PROGRESS NOTES
Inpatient History and Physical Surgical Orders    Preadmission Location:   Preadmission Time:  Facility:  Surgery Date:  Surgery Time:  Preadmission Test date:     Chief Complaint  Outpatient History and Physical / Surgical Orders    Primary Care Provider: Provider, No Known    Referring Provider: Luke Lerner MD    Subjective      Patient Name: Francisco Griffin : 1975    HPI  The patient is a 48-year-old gentleman who presents for peritoneal dialysis catheter placement.  He is currently doing hemodialysis but wishes to try peritoneal dialysis at home.    Past History:  Medical History: has a past medical history of Anemia and Renal disorder.   Surgical History: has a past surgical history that includes Portacath placement.   Family History: family history is not on file. He was adopted.   Social History: reports that he has never smoked. He has never been exposed to tobacco smoke. He has never used smokeless tobacco. He reports that he does not drink alcohol and does not use drugs.  Allergies: Patient has no known allergies.       Current Outpatient Medications:     calcium carbonate EX (TUMS EX) 750 MG chewable tablet, Chew 1 tablet., Disp: , Rfl:     carvedilol (COREG) 12.5 MG tablet, Take 1 tablet by mouth Every 12 (Twelve) Hours., Disp: , Rfl:     erythromycin (ROMYCIN) 5 MG/GM ophthalmic ointment, PLACE A SMALL AMOUNT INTO BOTH EYES EVERY 4 HOURS FOR 14 DAYS, Disp: , Rfl:     labetalol (NORMODYNE) 300 MG tablet, Take 1 tablet by mouth Every 12 (Twelve) Hours., Disp: , Rfl:     losartan (COZAAR) 25 MG tablet, Take 1 tablet by mouth Daily., Disp: , Rfl:     NIFEdipine CC (ADALAT CC) 30 MG 24 hr tablet, TAKE 2 TABELTS BY MOUTH TWICE DAILY, Disp: , Rfl:     pantoprazole (PROTONIX) 40 MG EC tablet, Take 1 tablet by mouth Daily., Disp: , Rfl:     prednisoLONE acetate (PRED FORTE) 1 % ophthalmic suspension, , Disp: , Rfl:     vitamin D (ERGOCALCIFEROL) 1.25 MG (01781 UT) capsule capsule, Take 1 capsule by  "mouth 1 (One) Time Per Week., Disp: , Rfl:        Objective   Vital Signs:   Resp 18   Ht 177.8 cm (70\")   Wt 64.4 kg (142 lb)   BMI 20.37 kg/m²       Physical Exam  Vitals and nursing note reviewed.   Constitutional:       Appearance: Normal appearance. The patient is well-developed.   Cardiovascular:      Rate and Rhythm: Normal rate and regular rhythm.   Pulmonary:      Effort: Pulmonary effort is normal.      Breath sounds: Normal air entry.   Abdominal:      General: Bowel sounds are normal.      Palpations: Abdomen is soft.      Skin:     General: Skin is warm and dry.   Neurological:      Mental Status: The patient is alert and oriented to person, place, and time.      Motor: Motor function is intact.   Psychiatric:         Mood and Affect: Mood normal.       Result Review :               Assessment and Plan   Diagnoses and all orders for this visit:    1. ESRD (end stage renal disease) (Primary)  -     Case Request; Standing  -     Follow Anesthesia Guidelines / Protocol; Standing  -     Verify NPO Status; Standing  -     Obtain Informed Consent; Standing  -     Verify / Perform Chlorhexidine Skin Prep; Standing  -     Insert Peripheral IV; Standing  -     Saline Lock & Maintain IV Access; Standing  -     sodium chloride 0.9 % flush 10 mL  -     sodium chloride 0.9 % flush 10 mL  -     sodium chloride 0.9 % infusion 40 mL  -     lactated ringers infusion  -     Place Sequential Compression Device; Standing  -     Maintain Sequential Compression Device; Standing  -     ondansetron (ZOFRAN) injection 4 mg  -     ceFAZolin (ANCEF) 2,000 mg in sodium chloride 0.9 % 100 mL IVPB  -     Case Request    We will schedule him for a laparoscopic peritoneal dialysis catheter placement.  I have described that procedure to him as well as the risk and benefits and he is agreeable to proceeding.    I  Codey Ma MD  07/09/2024    "

## 2024-07-11 ENCOUNTER — HOSPITAL ENCOUNTER (OUTPATIENT)
Dept: ULTRASOUND IMAGING | Facility: HOSPITAL | Age: 49
Discharge: HOME | End: 2024-07-11
Admitting: INTERNAL MEDICINE
Payer: COMMERCIAL

## 2024-07-11 DIAGNOSIS — N28.1 ACQUIRED COMPLEX RENAL CYST: ICD-10-CM

## 2024-07-11 PROCEDURE — 76775 US EXAM ABDO BACK WALL LIM: CPT

## 2024-07-25 ENCOUNTER — HOSPITAL ENCOUNTER (OUTPATIENT)
Facility: HOSPITAL | Age: 49
Discharge: HOME | End: 2024-07-26
Attending: SURGERY | Admitting: SURGERY
Payer: COMMERCIAL

## 2024-07-25 ENCOUNTER — ANESTHESIA (OUTPATIENT)
Dept: PERIOP | Facility: HOSPITAL | Age: 49
End: 2024-07-25
Payer: COMMERCIAL

## 2024-07-25 ENCOUNTER — ANESTHESIA EVENT (OUTPATIENT)
Dept: PERIOP | Facility: HOSPITAL | Age: 49
End: 2024-07-25
Payer: COMMERCIAL

## 2024-07-25 DIAGNOSIS — N18.6 ESRD (END STAGE RENAL DISEASE): ICD-10-CM

## 2024-07-25 LAB
ANION GAP SERPL CALCULATED.3IONS-SCNC: 15.6 MMOL/L (ref 5–15)
BUN SERPL-MCNC: 47 MG/DL (ref 6–20)
BUN/CREAT SERPL: 5.6 (ref 7–25)
CALCIUM SPEC-SCNC: 9.3 MG/DL (ref 8.6–10.5)
CHLORIDE SERPL-SCNC: 98 MMOL/L (ref 98–107)
CO2 SERPL-SCNC: 26.4 MMOL/L (ref 22–29)
CREAT SERPL-MCNC: 8.32 MG/DL (ref 0.76–1.27)
DEPRECATED RDW RBC AUTO: 51.4 FL (ref 37–54)
EGFRCR SERPLBLD CKD-EPI 2021: 7.4 ML/MIN/1.73
ERYTHROCYTE [DISTWIDTH] IN BLOOD BY AUTOMATED COUNT: 14.6 % (ref 12.3–15.4)
GLUCOSE SERPL-MCNC: 101 MG/DL (ref 65–99)
HCT VFR BLD AUTO: 31.1 % (ref 37.5–51)
HGB BLD-MCNC: 10.4 G/DL (ref 13–17.7)
MCH RBC QN AUTO: 32.4 PG (ref 26.6–33)
MCHC RBC AUTO-ENTMCNC: 33.4 G/DL (ref 31.5–35.7)
MCV RBC AUTO: 96.9 FL (ref 79–97)
PLATELET # BLD AUTO: 197 10*3/MM3 (ref 140–450)
PMV BLD AUTO: 9.4 FL (ref 6–12)
POTASSIUM SERPL-SCNC: 4.6 MMOL/L (ref 3.5–5.2)
RBC # BLD AUTO: 3.21 10*6/MM3 (ref 4.14–5.8)
SODIUM SERPL-SCNC: 140 MMOL/L (ref 136–145)
WBC NRBC COR # BLD AUTO: 6.6 10*3/MM3 (ref 3.4–10.8)

## 2024-07-25 PROCEDURE — C1750 CATH, HEMODIALYSIS,LONG-TERM: HCPCS | Performed by: SURGERY

## 2024-07-25 PROCEDURE — 25010000002 ONDANSETRON PER 1 MG

## 2024-07-25 PROCEDURE — 25810000003 LACTATED RINGERS PER 1000 ML: Performed by: SURGERY

## 2024-07-25 PROCEDURE — 25010000002 CEFAZOLIN PER 500 MG: Performed by: SURGERY

## 2024-07-25 PROCEDURE — 25010000002 SUGAMMADEX 200 MG/2ML SOLUTION

## 2024-07-25 PROCEDURE — 25010000002 FENTANYL CITRATE (PF) 50 MCG/ML SOLUTION

## 2024-07-25 PROCEDURE — 85027 COMPLETE CBC AUTOMATED: CPT | Performed by: ANESTHESIOLOGY

## 2024-07-25 PROCEDURE — 49324 LAP INSERT TUNNEL IP CATH: CPT | Performed by: SURGERY

## 2024-07-25 PROCEDURE — 80048 BASIC METABOLIC PNL TOTAL CA: CPT | Performed by: ANESTHESIOLOGY

## 2024-07-25 PROCEDURE — 25010000002 HEPARIN (PORCINE) PER 1000 UNITS: Performed by: SURGERY

## 2024-07-25 PROCEDURE — 25010000002 DEXAMETHASONE PER 1 MG

## 2024-07-25 PROCEDURE — 25010000002 BUPIVACAINE (PF) 0.25 % SOLUTION: Performed by: SURGERY

## 2024-07-25 PROCEDURE — 25010000002 PROPOFOL 10 MG/ML EMULSION

## 2024-07-25 PROCEDURE — 25010000002 HYDRALAZINE PER 20 MG: Performed by: STUDENT IN AN ORGANIZED HEALTH CARE EDUCATION/TRAINING PROGRAM

## 2024-07-25 PROCEDURE — 25010000002 MIDAZOLAM PER 1MG: Performed by: ANESTHESIOLOGY

## 2024-07-25 DEVICE — IMPLANTABLE DEVICE: Type: IMPLANTABLE DEVICE | Site: ABDOMEN | Status: FUNCTIONAL

## 2024-07-25 RX ORDER — HYDROCODONE BITARTRATE AND ACETAMINOPHEN 5; 325 MG/1; MG/1
1 TABLET ORAL EVERY 4 HOURS PRN
Status: DISCONTINUED | OUTPATIENT
Start: 2024-07-25 | End: 2024-07-26 | Stop reason: HOSPADM

## 2024-07-25 RX ORDER — CALCIUM CARBONATE 500 MG/1
750 TABLET, CHEWABLE ORAL DAILY
Status: DISCONTINUED | OUTPATIENT
Start: 2024-07-25 | End: 2024-07-25

## 2024-07-25 RX ORDER — OXYCODONE HYDROCHLORIDE 5 MG/1
5 TABLET ORAL
Status: COMPLETED | OUTPATIENT
Start: 2024-07-25 | End: 2024-07-25

## 2024-07-25 RX ORDER — CALCIUM CARBONATE 500 MG/1
500 TABLET, CHEWABLE ORAL
Status: DISCONTINUED | OUTPATIENT
Start: 2024-07-26 | End: 2024-07-26 | Stop reason: HOSPADM

## 2024-07-25 RX ORDER — HYDRALAZINE HYDROCHLORIDE 20 MG/ML
10 INJECTION INTRAMUSCULAR; INTRAVENOUS EVERY 6 HOURS PRN
Status: DISCONTINUED | OUTPATIENT
Start: 2024-07-25 | End: 2024-07-26 | Stop reason: HOSPADM

## 2024-07-25 RX ORDER — FENTANYL CITRATE 50 UG/ML
INJECTION, SOLUTION INTRAMUSCULAR; INTRAVENOUS AS NEEDED
Status: DISCONTINUED | OUTPATIENT
Start: 2024-07-25 | End: 2024-07-25 | Stop reason: SURG

## 2024-07-25 RX ORDER — DOCUSATE SODIUM 100 MG/1
100 CAPSULE, LIQUID FILLED ORAL 2 TIMES DAILY PRN
Status: DISCONTINUED | OUTPATIENT
Start: 2024-07-25 | End: 2024-07-26 | Stop reason: HOSPADM

## 2024-07-25 RX ORDER — BUPIVACAINE HYDROCHLORIDE 2.5 MG/ML
INJECTION, SOLUTION EPIDURAL; INFILTRATION; INTRACAUDAL AS NEEDED
Status: DISCONTINUED | OUTPATIENT
Start: 2024-07-25 | End: 2024-07-25 | Stop reason: HOSPADM

## 2024-07-25 RX ORDER — ONDANSETRON 4 MG/1
4 TABLET, ORALLY DISINTEGRATING ORAL ONCE AS NEEDED
Status: DISCONTINUED | OUTPATIENT
Start: 2024-07-25 | End: 2024-07-26 | Stop reason: HOSPADM

## 2024-07-25 RX ORDER — ONDANSETRON 2 MG/ML
4 INJECTION INTRAMUSCULAR; INTRAVENOUS ONCE AS NEEDED
Status: DISCONTINUED | OUTPATIENT
Start: 2024-07-25 | End: 2024-07-25 | Stop reason: SDUPTHER

## 2024-07-25 RX ORDER — MORPHINE SULFATE 2 MG/ML
2 INJECTION, SOLUTION INTRAMUSCULAR; INTRAVENOUS
Status: DISCONTINUED | OUTPATIENT
Start: 2024-07-25 | End: 2024-07-26 | Stop reason: HOSPADM

## 2024-07-25 RX ORDER — ONDANSETRON 2 MG/ML
4 INJECTION INTRAMUSCULAR; INTRAVENOUS ONCE AS NEEDED
Status: DISCONTINUED | OUTPATIENT
Start: 2024-07-25 | End: 2024-07-25 | Stop reason: HOSPADM

## 2024-07-25 RX ORDER — NALOXONE HCL 0.4 MG/ML
0.4 VIAL (ML) INJECTION
Status: DISCONTINUED | OUTPATIENT
Start: 2024-07-25 | End: 2024-07-26 | Stop reason: HOSPADM

## 2024-07-25 RX ORDER — MAGNESIUM HYDROXIDE 1200 MG/15ML
LIQUID ORAL AS NEEDED
Status: DISCONTINUED | OUTPATIENT
Start: 2024-07-25 | End: 2024-07-25 | Stop reason: HOSPADM

## 2024-07-25 RX ORDER — LIDOCAINE HYDROCHLORIDE 20 MG/ML
INJECTION, SOLUTION EPIDURAL; INFILTRATION; INTRACAUDAL; PERINEURAL AS NEEDED
Status: DISCONTINUED | OUTPATIENT
Start: 2024-07-25 | End: 2024-07-25 | Stop reason: SURG

## 2024-07-25 RX ORDER — SODIUM CHLORIDE 9 MG/ML
9 INJECTION, SOLUTION INTRAVENOUS CONTINUOUS PRN
Status: DISCONTINUED | OUTPATIENT
Start: 2024-07-25 | End: 2024-07-25 | Stop reason: HOSPADM

## 2024-07-25 RX ORDER — IBUPROFEN 600 MG/1
600 TABLET ORAL EVERY 6 HOURS PRN
Status: DISCONTINUED | OUTPATIENT
Start: 2024-07-25 | End: 2024-07-25 | Stop reason: HOSPADM

## 2024-07-25 RX ORDER — ROCURONIUM BROMIDE 10 MG/ML
INJECTION, SOLUTION INTRAVENOUS AS NEEDED
Status: DISCONTINUED | OUTPATIENT
Start: 2024-07-25 | End: 2024-07-25 | Stop reason: SURG

## 2024-07-25 RX ORDER — ACETAMINOPHEN 500 MG
1000 TABLET ORAL ONCE
Status: COMPLETED | OUTPATIENT
Start: 2024-07-25 | End: 2024-07-25

## 2024-07-25 RX ORDER — CARVEDILOL 12.5 MG/1
12.5 TABLET ORAL EVERY 12 HOURS SCHEDULED
Status: DISCONTINUED | OUTPATIENT
Start: 2024-07-25 | End: 2024-07-26 | Stop reason: HOSPADM

## 2024-07-25 RX ORDER — ACETAMINOPHEN 650 MG/1
650 SUPPOSITORY RECTAL EVERY 4 HOURS PRN
Status: DISCONTINUED | OUTPATIENT
Start: 2024-07-25 | End: 2024-07-26 | Stop reason: HOSPADM

## 2024-07-25 RX ORDER — PANTOPRAZOLE SODIUM 40 MG/1
40 TABLET, DELAYED RELEASE ORAL DAILY
Status: DISCONTINUED | OUTPATIENT
Start: 2024-07-25 | End: 2024-07-26 | Stop reason: HOSPADM

## 2024-07-25 RX ORDER — ACETAMINOPHEN 325 MG/1
650 TABLET ORAL EVERY 4 HOURS PRN
Status: DISCONTINUED | OUTPATIENT
Start: 2024-07-25 | End: 2024-07-26 | Stop reason: HOSPADM

## 2024-07-25 RX ORDER — SODIUM CHLORIDE 9 MG/ML
40 INJECTION, SOLUTION INTRAVENOUS AS NEEDED
Status: DISCONTINUED | OUTPATIENT
Start: 2024-07-25 | End: 2024-07-26 | Stop reason: HOSPADM

## 2024-07-25 RX ORDER — SODIUM CHLORIDE 0.9 % (FLUSH) 0.9 %
10 SYRINGE (ML) INJECTION AS NEEDED
Status: DISCONTINUED | OUTPATIENT
Start: 2024-07-25 | End: 2024-07-25 | Stop reason: HOSPADM

## 2024-07-25 RX ORDER — SODIUM CHLORIDE 0.9 % (FLUSH) 0.9 %
10 SYRINGE (ML) INJECTION EVERY 12 HOURS SCHEDULED
Status: DISCONTINUED | OUTPATIENT
Start: 2024-07-25 | End: 2024-07-25 | Stop reason: HOSPADM

## 2024-07-25 RX ORDER — MIDAZOLAM HYDROCHLORIDE 2 MG/2ML
2 INJECTION, SOLUTION INTRAMUSCULAR; INTRAVENOUS ONCE
Status: COMPLETED | OUTPATIENT
Start: 2024-07-25 | End: 2024-07-25

## 2024-07-25 RX ORDER — NIFEDIPINE 60 MG/1
60 TABLET, EXTENDED RELEASE ORAL
Status: DISCONTINUED | OUTPATIENT
Start: 2024-07-25 | End: 2024-07-25

## 2024-07-25 RX ORDER — HYDROCODONE BITARTRATE AND ACETAMINOPHEN 5; 325 MG/1; MG/1
1 TABLET ORAL ONCE AS NEEDED
Status: DISCONTINUED | OUTPATIENT
Start: 2024-07-25 | End: 2024-07-25 | Stop reason: HOSPADM

## 2024-07-25 RX ORDER — ONDANSETRON 2 MG/ML
4 INJECTION INTRAMUSCULAR; INTRAVENOUS EVERY 6 HOURS PRN
Status: DISCONTINUED | OUTPATIENT
Start: 2024-07-25 | End: 2024-07-26 | Stop reason: HOSPADM

## 2024-07-25 RX ORDER — NIFEDIPINE 60 MG/1
60 TABLET, EXTENDED RELEASE ORAL EVERY 12 HOURS
Status: DISCONTINUED | OUTPATIENT
Start: 2024-07-26 | End: 2024-07-26 | Stop reason: HOSPADM

## 2024-07-25 RX ORDER — SODIUM CHLORIDE 0.9 % (FLUSH) 0.9 %
10 SYRINGE (ML) INJECTION EVERY 12 HOURS SCHEDULED
Status: DISCONTINUED | OUTPATIENT
Start: 2024-07-25 | End: 2024-07-26 | Stop reason: HOSPADM

## 2024-07-25 RX ORDER — DEXAMETHASONE SODIUM PHOSPHATE 4 MG/ML
INJECTION, SOLUTION INTRA-ARTICULAR; INTRALESIONAL; INTRAMUSCULAR; INTRAVENOUS; SOFT TISSUE AS NEEDED
Status: DISCONTINUED | OUTPATIENT
Start: 2024-07-25 | End: 2024-07-25 | Stop reason: SURG

## 2024-07-25 RX ORDER — SODIUM CHLORIDE 9 MG/ML
40 INJECTION, SOLUTION INTRAVENOUS AS NEEDED
Status: DISCONTINUED | OUTPATIENT
Start: 2024-07-25 | End: 2024-07-25 | Stop reason: HOSPADM

## 2024-07-25 RX ORDER — ONDANSETRON 4 MG/1
4 TABLET, ORALLY DISINTEGRATING ORAL EVERY 6 HOURS PRN
Status: DISCONTINUED | OUTPATIENT
Start: 2024-07-25 | End: 2024-07-26 | Stop reason: HOSPADM

## 2024-07-25 RX ORDER — PROPOFOL 10 MG/ML
VIAL (ML) INTRAVENOUS AS NEEDED
Status: DISCONTINUED | OUTPATIENT
Start: 2024-07-25 | End: 2024-07-25 | Stop reason: SURG

## 2024-07-25 RX ORDER — SODIUM CHLORIDE 0.9 % (FLUSH) 0.9 %
10 SYRINGE (ML) INJECTION AS NEEDED
Status: DISCONTINUED | OUTPATIENT
Start: 2024-07-25 | End: 2024-07-26 | Stop reason: HOSPADM

## 2024-07-25 RX ORDER — SODIUM CHLORIDE, SODIUM LACTATE, POTASSIUM CHLORIDE, CALCIUM CHLORIDE 600; 310; 30; 20 MG/100ML; MG/100ML; MG/100ML; MG/100ML
70 INJECTION, SOLUTION INTRAVENOUS CONTINUOUS
Status: DISCONTINUED | OUTPATIENT
Start: 2024-07-25 | End: 2024-07-25

## 2024-07-25 RX ORDER — HEPARIN SODIUM 5000 [USP'U]/ML
5000 INJECTION, SOLUTION INTRAVENOUS; SUBCUTANEOUS EVERY 12 HOURS SCHEDULED
Status: DISCONTINUED | OUTPATIENT
Start: 2024-07-26 | End: 2024-07-26 | Stop reason: HOSPADM

## 2024-07-25 RX ORDER — ONDANSETRON 2 MG/ML
INJECTION INTRAMUSCULAR; INTRAVENOUS AS NEEDED
Status: DISCONTINUED | OUTPATIENT
Start: 2024-07-25 | End: 2024-07-25 | Stop reason: SURG

## 2024-07-25 RX ORDER — HYDROCODONE BITARTRATE AND ACETAMINOPHEN 5; 325 MG/1; MG/1
1 TABLET ORAL EVERY 6 HOURS PRN
Qty: 10 TABLET | Refills: 0 | Status: SHIPPED | OUTPATIENT
Start: 2024-07-25

## 2024-07-25 RX ADMIN — ROCURONIUM BROMIDE 50 MG: 10 INJECTION, SOLUTION INTRAVENOUS at 08:35

## 2024-07-25 RX ADMIN — OXYCODONE HYDROCHLORIDE 5 MG: 5 TABLET ORAL at 09:43

## 2024-07-25 RX ADMIN — HYDROCODONE BITARTRATE AND ACETAMINOPHEN 1 TABLET: 5; 325 TABLET ORAL at 17:33

## 2024-07-25 RX ADMIN — SUGAMMADEX 200 MG: 100 INJECTION, SOLUTION INTRAVENOUS at 09:05

## 2024-07-25 RX ADMIN — CALCIUM CARBONATE 750 MG: 500 TABLET, CHEWABLE ORAL at 17:25

## 2024-07-25 RX ADMIN — OXYCODONE HYDROCHLORIDE 5 MG: 5 TABLET ORAL at 10:18

## 2024-07-25 RX ADMIN — SODIUM CHLORIDE, POTASSIUM CHLORIDE, SODIUM LACTATE AND CALCIUM CHLORIDE 70 ML/HR: 600; 310; 30; 20 INJECTION, SOLUTION INTRAVENOUS at 08:06

## 2024-07-25 RX ADMIN — MIDAZOLAM HYDROCHLORIDE 2 MG: 1 INJECTION, SOLUTION INTRAMUSCULAR; INTRAVENOUS at 08:07

## 2024-07-25 RX ADMIN — NIFEDIPINE 60 MG: 60 TABLET, EXTENDED RELEASE ORAL at 17:25

## 2024-07-25 RX ADMIN — FENTANYL CITRATE 50 MCG: 50 INJECTION, SOLUTION INTRAMUSCULAR; INTRAVENOUS at 08:35

## 2024-07-25 RX ADMIN — CARVEDILOL 12.5 MG: 12.5 TABLET, FILM COATED ORAL at 17:25

## 2024-07-25 RX ADMIN — LIDOCAINE HYDROCHLORIDE 100 MG: 20 INJECTION, SOLUTION INTRAVENOUS at 08:35

## 2024-07-25 RX ADMIN — SODIUM CHLORIDE 2000 MG: 9 INJECTION, SOLUTION INTRAVENOUS at 08:40

## 2024-07-25 RX ADMIN — ACETAMINOPHEN 1000 MG: 500 TABLET ORAL at 08:06

## 2024-07-25 RX ADMIN — HYDRALAZINE HYDROCHLORIDE 10 MG: 20 INJECTION, SOLUTION INTRAMUSCULAR; INTRAVENOUS at 23:00

## 2024-07-25 RX ADMIN — ONDANSETRON HYDROCHLORIDE 4 MG: 2 SOLUTION INTRAMUSCULAR; INTRAVENOUS at 09:04

## 2024-07-25 RX ADMIN — DEXAMETHASONE SODIUM PHOSPHATE 4 MG: 4 INJECTION, SOLUTION INTRAMUSCULAR; INTRAVENOUS at 08:39

## 2024-07-25 RX ADMIN — PROPOFOL 150 MG: 10 INJECTION, EMULSION INTRAVENOUS at 08:35

## 2024-07-25 RX ADMIN — Medication 10 ML: at 22:35

## 2024-07-25 NOTE — NURSING NOTE
[facility] has spoke with Dr. Ma and he plans on admitting pt overnight and will put in orders shortly.

## 2024-07-25 NOTE — NURSING NOTE
Patient was scheduled for surgery this AM. Patient was transported to hospital via [employer]. Patient informed this nurse that he has no family or friends that could stay with him 24 hours postoperatively per hospital policy.   This nurse explained to the patient that without presence of another person to look after him today that his surgery could possibly get cancelled.   This nurse witnessed patient calling a co-worker who is home today and she was agreeable to stay with the patient for 24 hours postoperatively.

## 2024-07-25 NOTE — PLAN OF CARE
Goal Outcome Evaluation:           Progress: improving  Outcome Evaluation: VSS. UOP. Up ad clement. No c/o pain. Potential dc tomorrow.

## 2024-07-25 NOTE — DISCHARGE INSTRUCTIONS
DISCHARGE INSTRUCTIONS  SURGICAL / AMBULATORY  PROCEDURES      For your surgery you had:  General anesthesia (you may have a sore throat for the first 24 hours)  IV sedation.  Local anesthesia  Monitored anesthesia Care  You may experience dizziness, drowsiness, or light-headedness for several hours following surgery/procedure.  Do not stay alone today or tonight.  Limit your activity for 24 hours.  Resume your diet slowly.  Follow whatever special dietary instructions you may have been given by your doctor.  You should not drive or operate machinery, drink alcohol, or sign legally binding documents for 24 hours or while you are taking pain medication.  Last dose of pain medication was given at:   .  NOTIFY YOUR DOCTOR IF YOU EXPERIENCE ANY OF THE FOLLOWING:  Temperature greater than 101 degrees Fahrenheit  Shaking Chills  Redness or excessive drainage from incision  Nausea, vomiting and/or pain that is not controlled by prescribed medications  Increase in bleeding or bleeding that is excessive  Unable to urinate in 6 hours after surgery  If unable to reach your doctor, please go to the closest Emergency Room  You may begin dressing changes:     [] in 24 hours   [x] in 48 hours   [] Other:      .  .  Medications per physician instructions as indicated on Discharge Medication Information Sheet.  You should see   for follow-up care   on   .  Phone number:       SPECIAL INSTRUCTIONS:

## 2024-07-25 NOTE — ANESTHESIA POSTPROCEDURE EVALUATION
Patient: Francisco Griffin    Procedure Summary       Date: 07/25/24 Room / Location: [facility] OR  /  [facility]    Anesthesia Start: 0831 Anesthesia Stop: 0916    Procedure: INSERTION PERITONEAL DIALYSIS CATHETER LAPAROSCOPIC (Abdomen) Diagnosis:       ESRD (end stage renal disease)      (ESRD (end stage renal disease) [N18.6])    Surgeons: Codey Ma MD Provider: Chris Hernandez MD    Anesthesia Type: general ASA Status: 3            Anesthesia Type: general    Vitals  Vitals Value Taken Time   /101 07/25/24 1020   Temp 36.3 °C (97.3 °F) 07/25/24 0914   Pulse 71 07/25/24 1022   Resp 18 07/25/24 0950   SpO2 90 % 07/25/24 1022   Vitals shown include unfiled device data.        Post Anesthesia Care and Evaluation    Patient location during evaluation: bedside  Patient participation: complete - patient participated  Level of consciousness: awake  Pain management: adequate    Airway patency: patent  PONV Status: none  Cardiovascular status: acceptable and stable  Respiratory status: acceptable  Hydration status: acceptable

## 2024-07-25 NOTE — OP NOTE
INSERTION PERITONEAL DIALYSIS CATHETER LAPAROSCOPIC  Procedure Report    Patient Name:  Francisco Griffin  YOB: 1975    Date of Surgery:  7/25/2024     Indications: The patient is a 48-year-old gentleman with end-stage renal disease.  He wished to try peritoneal dialysis and the decision was made to proceed with a laparoscopic placement of a peritoneal dialysis catheter.    Pre-op Diagnosis: End-stage renal disease    Post-Op Diagnosis: Same    Procedure/CPT® Codes:    Laparoscopic peritoneal dialysis catheter placement    Staff:  Surgeon(s):  Codey Ma MD    Assistant: Marci Graham CRNFA    Anesthesia: General    Estimated Blood Loss: minimal    Implants:    Implant Name Type Inv. Item Serial No.  Lot No. LRB No. Used Action   CATH CURL GLADIS SWAN NK 2CUF 62.5CM RT - [id number] Implant CATH CURL GLADIS SWAN NK 2CUF 62.5CM RT  [employer] [id number] N/A 1 Implanted       Specimen:          None        Findings: None    Complications: None    Description of Procedure: The patient was taken to the operating room and placed on the table in supine position.  After induction of general anesthesia, the abdomen was prepped and draped sterilely.  The abdomen was insufflated with a Veress needle and a 5 mm left flank port was placed in the peritoneal cavity using a Visiport.  A 5 mm supraumbilical port was placed under direct vision.  He was noted to have no intra-abdominal adhesions to speak of.  He did not have a lengthy omentum so we did not perform an omentopexy.  An 8 mm trocar was placed in the right lateral abdomen and passed through the fascia and then directed inferiorly before entering the peritoneum lower in the abdomen.  A curled peritoneal dialysis catheter was then inserted through the trocar and then the trocar was removed.  The end of the catheter was then placed down in the pelvis anterior to the rectum and posterior to the bladder.  We were able to flush heparinized saline  through the catheter and the catheter appeared to aspirate freely.  At this point we had good hemostasis.  The pneumoperitoneum was released and the other ports were removed.  Our skin incisions were closed with 4-0 Vicryl subcuticular sutures.  Sterile dressings were applied and he was taken the postanesthesia recovery room in stable condition.    Assistant: Marci Graham CRNFA  was responsible for performing the following activities: Retraction, Suturing, Placing Dressing, and Held/Positioned Camera and their skilled assistance was necessary for the success of this case.    Codey Ma MD     Date: 7/25/2024  Time: 09:07 EDT

## 2024-07-25 NOTE — ANESTHESIA PREPROCEDURE EVALUATION
Anesthesia Evaluation     Patient summary reviewed and Nursing notes reviewed   no history of anesthetic complications:   NPO Solid Status: > 8 hours  NPO Liquid Status: > 2 hours           Airway   Mallampati: II  TM distance: >3 FB  Neck ROM: full  No difficulty expected  Dental      Pulmonary - negative pulmonary ROS and normal exam    breath sounds clear to auscultation  Cardiovascular - normal exam  Exercise tolerance: good (4-7 METS)    Rhythm: regular  Rate: normal    (+) hypertension      Neuro/Psych- negative ROS  GI/Hepatic/Renal/Endo    (+) renal disease (last HD 7/24)- ESRD and dialysis    Musculoskeletal     Abdominal    Substance History      OB/GYN          Other        ROS/Med Hx Other: PAT Nursing Notes unavailable.            Narrative ECHO 2020    This result has an attachment that is not available.    Left Ventricle: Cavity size is normal. Wall thickness is normal.  Systolic function is low normal with an estimated ejection fraction of  50%-55%.    Left Ventricle: Wall motion was normal; there were no diagnostic  regional wall motion abnormalities.    Right Ventricle: Cavity size appears normal. Systolic function is  normal.    Left Atrium: Cavity size is normal. Volume index is normal.    Right Atrium: Normal in size.    Aortic Valve: Tricuspid. There is no regurgitation. Transvalvular  velocity was normal.    Mitral Valve: Structure is normal. There is trace regurgitation. There  is no evidence of mitral valve stenosis.    Tricuspid Valve: Structure is normal.    Tricuspid Valve: There is trace regurgitation.    Pulmonic Valve: Pulmonic valve structure is normal. There is trace  regurgitation.         Anesthesia Plan    ASA 3     general     (Patient understands anesthesia not responsible for dental damage.)  intravenous induction     Anesthetic plan, risks, benefits, and alternatives have been provided, discussed and informed consent has been obtained with: patient.    Plan discussed with  CRNA.        CODE STATUS:

## 2024-07-25 NOTE — NURSING NOTE
Spoke with pt regarding making arrangements with his co-worker and ordering an uber for her to come to the hospital and then another uber to take them both to his home. Pt has a entire flight of stairs to go into his apartment and no neighbors or friends to help him once he is discharged. Pt then informed me that his co-worker is mad at him and unwilling to help him today. I informed [facility] Surgery nurse manager of this information.

## 2024-07-26 VITALS
TEMPERATURE: 98.1 F | HEIGHT: 70 IN | WEIGHT: 143.52 LBS | RESPIRATION RATE: 18 BRPM | OXYGEN SATURATION: 93 % | HEART RATE: 80 BPM | SYSTOLIC BLOOD PRESSURE: 163 MMHG | DIASTOLIC BLOOD PRESSURE: 106 MMHG | BODY MASS INDEX: 20.55 KG/M2

## 2024-07-26 RX ADMIN — NIFEDIPINE 60 MG: 60 TABLET, EXTENDED RELEASE ORAL at 06:46

## 2024-07-26 RX ADMIN — CALCIUM CARBONATE 500 MG: 500 TABLET, CHEWABLE ORAL at 07:39

## 2024-07-26 RX ADMIN — CARVEDILOL 12.5 MG: 12.5 TABLET, FILM COATED ORAL at 06:46

## 2024-07-26 NOTE — DISCHARGE SUMMARY
Date of Admission: 7/25/2024    Date of Discharge:  7/26/2024    Discharge Diagnosis:   ESRD (end stage renal disease) [N18.6]  Post-Op Diagnosis Codes:     * ESRD (end stage renal disease) [N18.6]       Presenting Problem/History of Present Illness  Active Hospital Problems    Diagnosis  POA    **ESRD (end stage renal disease) [N18.6]  Unknown      Resolved Hospital Problems   No resolved problems to display.          Hospital Course  Francisco Griffin is a 48 y.o. male presented for a planned insertion of a peritoneal dialysis catheter.  He was admitted after the procedure for routine postoperative care.  Upon discharge he is tolerating a regular diet and his pain is controlled.     Procedures Performed    Procedure(s):  INSERTION PERITONEAL DIALYSIS CATHETER LAPAROSCOPIC  -------------------       Consults:   Consults       No orders found for last 30 day(s).            Condition on Discharge:  stable    Vital Signs  Temp:  [97.3 °F (36.3 °C)-99 °F (37.2 °C)] 98.1 °F (36.7 °C)  Heart Rate:  [58-85] 80  Resp:  [16-22] 18  BP: (114-181)/() 163/106       Discharge Disposition  Home or Self Care    Discharge Medications     Discharge Medications        New Medications        Instructions Start Date   HYDROcodone-acetaminophen 5-325 MG per tablet  Commonly known as: NORCO   1 tablet, Oral, Every 6 Hours PRN             Continue These Medications        Instructions Start Date   calcium carbonate  MG chewable tablet  Commonly known as: TUMS EX   750 mg, Oral      carvedilol 12.5 MG tablet  Commonly known as: COREG   1 tablet, Oral, Every 12 Hours Scheduled      NIFEdipine CC 30 MG 24 hr tablet  Commonly known as: ADALAT CC   TAKE 2 TABELTS BY MOUTH TWICE DAILY      pantoprazole 40 MG EC tablet  Commonly known as: PROTONIX   1 tablet, Oral, Daily               Discharge Diet:     Activity at Discharge:   Activity Instructions       Activity as Tolerated              Follow-up Appointments  Future Appointments    Date Time Provider Department Center   8/9/2024  9:45 AM Codey Ma MD [facility]     Additional Instructions for the Follow-ups that You Need to Schedule       Discharge Follow-up with Specified Provider: Dr. Ma; 2 Weeks   As directed      To: Dr. Ma   Follow Up: 2 Weeks        Notify Physician or Go To The ED For the Following Conditions   As directed      -Severe Pain  -Excessive Bleeding  -Fever over 102    Order Comments: -Severe Pain -Excessive Bleeding -Fever over 102                 Test Results Pending at Discharge       BELTRAN Manzo  07/26/24  08:16 EDT        Electronically signed by BELTRAN Manzo, 07/26/24, 8:16 AM EDT.

## 2024-07-26 NOTE — PLAN OF CARE
Goal Outcome Evaluation:           Progress: improving  Outcome Evaluation: Tolerable pain level, continuing to have elevated BP, discharge orders in place, preparing to discharge.

## 2024-07-26 NOTE — PLAN OF CARE
Goal Outcome Evaluation:  Plan of Care Reviewed With: patient        Progress: improving  Outcome Evaluation: pt ambulated halls multiple times/ laps. pt hypertensive despite po and iv meds (dr baird notified) pt asymptomatic.Marilyn Franklin RN

## 2024-07-26 NOTE — PROGRESS NOTES
"POST OP PROGRESS NOTE     Patient Name:  Francisco Griffin  YOB: 1975  [id number]   LOS: 0 days   1 Day Post-Op  Patient Care Team:  Provider, No Known as PCP - Dayami Steel APRN as Nurse Practitioner (Gastroenterology)          Subjective     Interval History:   VSS, afebrile, pain controlled, BP elevated    Review of Systems:    A complete review of systems was performed and all are negative except what is documented in the HPI.       Objective     Constitutional:  well nourished, no acute distress, appears stated age BP (!) 163/106 (BP Location: Right arm, Patient Position: Lying) Comment: nurse aware  Pulse 80   Temp 98.1 °F (36.7 °C) (Oral)   Resp 18   Ht 177.8 cm (70\")   Wt 65.1 kg (143 lb 8.3 oz)   SpO2 93%   BMI 20.59 kg/m²    Eyes:  anicteric sclerae, moist conjunctivae, no lid lag, PERRLA  ENMT:  oropharynx clear, moist mucous membranes, good dentition  Neck:   full ROM, trachea midline, no thyromegaly  Cardiovascular: RRR, S1 and S2 present, no MRG, heart rate 80, no pedal edema  Respiratory: lungs CTA, respirations even and unlabored  GI:  Abdomen soft, appropriately tender, nondistended, no HSM     Skin:  warm and dry, normal turgor, no rashes  Psychiatric:  alert and oriented x3, intact judgment and insight, cooperative          Results Review:       I reviewed the patient's new clinical results including  no new labs.     WBC   Date Value Ref Range Status   07/25/2024 6.60 3.40 - 10.80 10*3/mm3 Final     RBC   Date Value Ref Range Status   07/25/2024 3.21 (L) 4.14 - 5.80 10*6/mm3 Final     Hemoglobin   Date Value Ref Range Status   07/25/2024 10.4 (L) 13.0 - 17.7 g/dL Final     Hematocrit   Date Value Ref Range Status   07/25/2024 31.1 (L) 37.5 - 51.0 % Final     MCV   Date Value Ref Range Status   07/25/2024 96.9 79.0 - 97.0 fL Final     MCH   Date Value Ref Range Status   07/25/2024 32.4 26.6 - 33.0 pg Final     MCHC   Date Value Ref Range Status   07/25/2024 33.4 " "31.5 - 35.7 g/dL Final     RDW   Date Value Ref Range Status   07/25/2024 14.6 12.3 - 15.4 % Final     RDW-SD   Date Value Ref Range Status   07/25/2024 51.4 37.0 - 54.0 fl Final     MPV   Date Value Ref Range Status   07/25/2024 9.4 6.0 - 12.0 fL Final     Platelets   Date Value Ref Range Status   07/25/2024 197 140 - 450 10*3/mm3 Final         Basic Metabolic Panel    Sodium Sodium   Date Value Ref Range Status   07/25/2024 140 136 - 145 mmol/L Final      Potassium Potassium   Date Value Ref Range Status   07/25/2024 4.6 3.5 - 5.2 mmol/L Final      Chloride Chloride   Date Value Ref Range Status   07/25/2024 98 98 - 107 mmol/L Final      Bicarbonate No results found for: \"PLASMABICARB\"   BUN BUN   Date Value Ref Range Status   07/25/2024 47 (H) 6 - 20 mg/dL Final      Creatinine Creatinine   Date Value Ref Range Status   07/25/2024 8.32 (H) 0.76 - 1.27 mg/dL Final      Calcium Calcium   Date Value Ref Range Status   07/25/2024 9.3 8.6 - 10.5 mg/dL Final      Glucose      No components found for: \"GLUCOSE.*\"       Lab Results   Component Value Date    GLUCOSE 101 (H) 07/25/2024    BUN 47 (H) 07/25/2024    CREATININE 8.32 (H) 07/25/2024    EGFR 7.4 (L) 07/25/2024    BCR 5.6 (L) 07/25/2024    K 4.6 07/25/2024    CO2 26.4 07/25/2024    CALCIUM 9.3 07/25/2024    ALBUMIN 2.8 (L) 03/15/2024    BILITOT 0.3 03/15/2024    AST 38 03/15/2024    ALT 33 03/15/2024       IMAGING:  Imaging Results (Last 72 Hours)       ** No results found for the last 72 hours. **            Medications:    Current Facility-Administered Medications:     acetaminophen (TYLENOL) tablet 650 mg, 650 mg, Oral, Q4H PRN **OR** acetaminophen (TYLENOL) suppository 650 mg, 650 mg, Rectal, Q4H PRN, Codey Ma MD    calcium carbonate (TUMS) chewable tablet 500 mg (200 mg elemental), 500 mg, Oral, TID AC, Bro Lopez MD, 500 mg at 07/26/24 0739    carvedilol (COREG) tablet 12.5 mg, 12.5 mg, Oral, Q12H, Codey Ma MD, 12.5 mg at 07/26/24 " 0646    docusate sodium (COLACE) capsule 100 mg, 100 mg, Oral, BID PRN, Codey Ma MD    heparin (porcine) 5000 UNIT/ML injection 5,000 Units, 5,000 Units, Subcutaneous, Q12H, Codey Ma MD    hydrALAZINE (APRESOLINE) injection 10 mg, 10 mg, Intravenous, Q6H PRN, Bro Lopez MD, 10 mg at 07/25/24 2300    HYDROcodone-acetaminophen (NORCO) 5-325 MG per tablet 1 tablet, 1 tablet, Oral, Q4H PRN, Codey Ma MD, 1 tablet at 07/25/24 1733    morphine injection 2 mg, 2 mg, Intravenous, Q2H PRN **AND** naloxone (NARCAN) injection 0.4 mg, 0.4 mg, Intravenous, Q5 Min PRN, Codey Ma MD    NIFEdipine XL (PROCARDIA XL) 24 hr tablet 60 mg, 60 mg, Oral, Q12H, Bro Lopez MD, 60 mg at 07/26/24 0646    ondansetron ODT (ZOFRAN-ODT) disintegrating tablet 4 mg, 4 mg, Oral, Q6H PRN **OR** ondansetron (ZOFRAN) injection 4 mg, 4 mg, Intravenous, Q6H PRN, Codey Ma MD    ondansetron (ZOFRAN) injection 4 mg, 4 mg, Intravenous, Q6H PRN, Codey Ma MD    ondansetron ODT (ZOFRAN-ODT) disintegrating tablet 4 mg, 4 mg, Oral, Once PRN, Codey Ma MD    pantoprazole (PROTONIX) EC tablet 40 mg, 40 mg, Oral, Daily, Codey Ma MD    sodium chloride 0.9 % flush 10 mL, 10 mL, Intravenous, Q12H, Codey Ma MD, 10 mL at 07/25/24 2235    sodium chloride 0.9 % flush 10 mL, 10 mL, Intravenous, PRN, Codey Ma MD    sodium chloride 0.9 % infusion 40 mL, 40 mL, Intravenous, PRN, Codey Ma MD    Assessment & Plan       ESRD (end stage renal disease)  S/P laparoscopic peritoneal dialysis catheter placement  HTN   Patient to DC  home this morning and go to hemodialysis      Electronically signed by BELTRAN Manzo, 07/26/24, 8:13 AM EDT.

## 2024-08-09 ENCOUNTER — OFFICE VISIT (OUTPATIENT)
Dept: SURGERY | Facility: CLINIC | Age: 49
End: 2024-08-09
Payer: COMMERCIAL

## 2024-08-09 VITALS — HEIGHT: 70 IN | RESPIRATION RATE: 14 BRPM | BODY MASS INDEX: 21.19 KG/M2 | WEIGHT: 148 LBS

## 2024-08-09 DIAGNOSIS — N18.6 ESRD (END STAGE RENAL DISEASE): Primary | ICD-10-CM

## 2024-08-09 NOTE — PROGRESS NOTES
"Chief Complaint  PD Cath and Post-op    Subjective          Francisco Griffin presents to [facility] GENERAL SURGERY  History of Present Illness    Francisco Griffin is a 48 y.o. male  who presents today for a postoperative visit.     Patient is here for a follow-up after a laparoscopic peritoneal dialysis catheter placement.  He is doing fine.  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    Past History:  Medical History: has a past medical history of Anemia, Hypertension, Mitral valve prolapse, and Renal disorder.   Surgical History: has a past surgical history that includes Portacath placement and Peritoneal Dialysis Catheter (N/A, 7/25/2024).   Family History: family history is not on file. He was adopted.   Social History: reports that he has never smoked. He has never been exposed to tobacco smoke. He has never used smokeless tobacco. He reports that he does not drink alcohol and does not use drugs.  Allergies: Zofran [ondansetron hcl]       Current Outpatient Medications:     calcium carbonate EX (TUMS EX) 750 MG chewable tablet, Chew 1 tablet., Disp: , Rfl:     carvedilol (COREG) 12.5 MG tablet, Take 1 tablet by mouth Every 12 (Twelve) Hours., Disp: , Rfl:     HYDROcodone-acetaminophen (NORCO) 5-325 MG per tablet, Take 1 tablet by mouth Every 6 (Six) Hours As Needed for Moderate Pain (Pain)., Disp: 10 tablet, Rfl: 0    NIFEdipine CC (ADALAT CC) 30 MG 24 hr tablet, TAKE 2 TABELTS BY MOUTH TWICE DAILY, Disp: , Rfl:     pantoprazole (PROTONIX) 40 MG EC tablet, Take 1 tablet by mouth Daily., Disp: , Rfl:        Physical Exam  His incisions look good and there is no evidence of infection.  Objective     Vital Signs:   Resp 14   Ht 177.8 cm (70\")   Wt 67.1 kg (148 lb)   BMI 21.24 kg/m²              Assessment and Plan    Diagnoses and all orders for this visit:    1. ESRD (end stage renal disease) (Primary)    At this point I will see him back on an as-needed basis.  " I have asked him to call me if he were to have any further questions.

## (undated) DEVICE — ENDOPATH XCEL WITH OPTIVIEW TECHNOLOGY BLADELESS TROCARS WITH STABILITY SLEEVES: Brand: ENDOPATH XCEL OPTIVIEW

## (undated) DEVICE — PERITONEAL DIALYSIS ACCESSORY,TWO PART TITANIUM LUER ADAPTER: Brand: ARGYLE

## (undated) DEVICE — ENDOPATH PNEUMONEEDLE INSUFFLATION NEEDLES WITH LUER LOCK CONNECTORS 120MM: Brand: ENDOPATH

## (undated) DEVICE — SYR LUERLOK 50ML

## (undated) DEVICE — ANTIBACTERIAL UNDYED BRAIDED (POLYGLACTIN 910), SYNTHETIC ABSORBABLE SUTURE: Brand: COATED VICRYL

## (undated) DEVICE — NON-WOVEN ADHESIVE WOUND DRESSING: Brand: PRIMAPORE ADHESIVE WOUND DRSG 7.2*5CM

## (undated) DEVICE — LAPAROSCOPIC TROCAR SLEEVE/SINGLE USE: Brand: KII® OPTICAL ACCESS SYSTEM

## (undated) DEVICE — SLV SCD KN/LEN ADJ EXPRSS BLENDED MD 1P/U

## (undated) DEVICE — TROCAR: Brand: KII® SLEEVE

## (undated) DEVICE — GLV SURG SENSICARE PI ORTHO SZ8 LF STRL

## (undated) DEVICE — LAPAROVUE VISIBILITY SYSTEM LAPAROSCOPIC SOLUTIONS: Brand: LAPAROVUE

## (undated) DEVICE — SOL IRR NACL 0.9PCT BT 1000ML

## (undated) DEVICE — GOWN,REINFRCE,POLY,SIRUS,BREATH SLV,XXLG: Brand: MEDLINE

## (undated) DEVICE — STRIP CLS WND SUTURESTRIP/PLS 0.5X4IN TP1103

## (undated) DEVICE — ENDOPATH XCEL BLADELESS TROCARS WITH STABILITY SLEEVES: Brand: ENDOPATH XCEL

## (undated) DEVICE — DECANT BG O JET

## (undated) DEVICE — GENERAL LAPAROSCOPY-LF: Brand: MEDLINE INDUSTRIES, INC.

## (undated) DEVICE — INTENDED FOR TISSUE SEPARATION, AND OTHER PROCEDURES THAT REQUIRE A SHARP SURGICAL BLADE TO PUNCTURE OR CUT.: Brand: BARD-PARKER ® CARBON RIB-BACK BLADES

## (undated) DEVICE — THIS SET CONSISTS OF A FEMALE LOCKING CONNECTOR/ON-OFF CLAMP ASSEMBLY, TUBING AND DOUBLE SEALING MALE LUER LOCK CONNECTOR. THIS SET IS TO BE USED WITH THE BAXTER LOCKING TITANIUM ADAPTER FOR PERITONEAL DIALYSIS CATHETER IN DISCONNECT APPLICATIONS AND IN CYCLER APPLICATIONS WHERE ASEPTIC CONNECTIONS AND DISCONNECTIONS ARE PERFORMED AT THE TRANSFER SET/CYCLER SET JUNCTURE.: Brand: MINICAP

## (undated) DEVICE — SUT ETHLN 3-0 FS118IN 663H

## (undated) DEVICE — PENCL SMOKE/EVAC MEGADYNE TELESCP 10FT

## (undated) DEVICE — STERILE POLYISOPRENE POWDER-FREE SURGICAL GLOVES: Brand: PROTEXIS